# Patient Record
Sex: FEMALE | Race: OTHER | Employment: UNEMPLOYED | URBAN - METROPOLITAN AREA
[De-identification: names, ages, dates, MRNs, and addresses within clinical notes are randomized per-mention and may not be internally consistent; named-entity substitution may affect disease eponyms.]

---

## 2017-01-30 ENCOUNTER — OFFICE VISIT (OUTPATIENT)
Dept: FAMILY MEDICINE CLINIC | Age: 62
End: 2017-01-30

## 2017-01-30 VITALS
TEMPERATURE: 98.4 F | BODY MASS INDEX: 23.47 KG/M2 | SYSTOLIC BLOOD PRESSURE: 100 MMHG | OXYGEN SATURATION: 97 % | WEIGHT: 124.2 LBS | HEART RATE: 119 BPM | DIASTOLIC BLOOD PRESSURE: 68 MMHG

## 2017-01-30 DIAGNOSIS — F41.9 ANXIETY: ICD-10-CM

## 2017-01-30 DIAGNOSIS — E78.2 MIXED HYPERLIPIDEMIA: Primary | ICD-10-CM

## 2017-01-30 DIAGNOSIS — D53.9 MACROCYTIC ANEMIA: ICD-10-CM

## 2017-01-30 DIAGNOSIS — G47.09 OTHER INSOMNIA: ICD-10-CM

## 2017-01-30 DIAGNOSIS — K21.9 GASTROESOPHAGEAL REFLUX DISEASE, ESOPHAGITIS PRESENCE NOT SPECIFIED: ICD-10-CM

## 2017-01-30 DIAGNOSIS — I10 ESSENTIAL HYPERTENSION: ICD-10-CM

## 2017-01-30 DIAGNOSIS — K52.9 AGE (ACUTE GASTROENTERITIS): ICD-10-CM

## 2017-01-30 DIAGNOSIS — M05.731 RHEUMATOID ARTHRITIS INVOLVING RIGHT WRIST WITH POSITIVE RHEUMATOID FACTOR (HCC): ICD-10-CM

## 2017-01-30 DIAGNOSIS — G47.00 INSOMNIA, UNSPECIFIED TYPE: ICD-10-CM

## 2017-01-30 PROCEDURE — 99214 OFFICE O/P EST MOD 30 MIN: CPT | Performed by: FAMILY MEDICINE

## 2017-01-30 RX ORDER — LOSARTAN POTASSIUM 50 MG/1
50 TABLET ORAL DAILY
Qty: 30 TABLET | Refills: 2 | Status: SHIPPED | OUTPATIENT
Start: 2017-01-30 | End: 2017-04-28 | Stop reason: SDUPTHER

## 2017-01-30 RX ORDER — ROSUVASTATIN CALCIUM 20 MG/1
20 TABLET, COATED ORAL NIGHTLY
Qty: 30 TABLET | Refills: 2 | Status: SHIPPED | OUTPATIENT
Start: 2017-01-30 | End: 2017-04-28 | Stop reason: SDUPTHER

## 2017-01-30 RX ORDER — ICOSAPENT ETHYL 1000 MG/1
2 CAPSULE ORAL 2 TIMES DAILY
Qty: 120 CAPSULE | Refills: 2 | Status: SHIPPED | OUTPATIENT
Start: 2017-01-30 | End: 2017-04-28 | Stop reason: SDUPTHER

## 2017-01-30 RX ORDER — CITALOPRAM 20 MG/1
20 TABLET ORAL DAILY
Qty: 30 TABLET | Refills: 2 | Status: SHIPPED | OUTPATIENT
Start: 2017-01-30 | End: 2017-04-28 | Stop reason: SDUPTHER

## 2017-01-30 RX ORDER — TRAZODONE HYDROCHLORIDE 50 MG/1
TABLET ORAL
Qty: 60 TABLET | Refills: 2 | Status: SHIPPED | OUTPATIENT
Start: 2017-01-30 | End: 2017-04-28 | Stop reason: SDUPTHER

## 2017-01-30 RX ORDER — ONDANSETRON 4 MG/1
4 TABLET, FILM COATED ORAL EVERY 12 HOURS PRN
Qty: 12 TABLET | Refills: 0 | Status: SHIPPED | OUTPATIENT
Start: 2017-01-30 | End: 2017-04-28 | Stop reason: SDUPTHER

## 2017-01-30 RX ORDER — GEMFIBROZIL 600 MG/1
600 TABLET, FILM COATED ORAL
Qty: 60 TABLET | Refills: 2 | Status: SHIPPED | OUTPATIENT
Start: 2017-01-30 | End: 2017-04-28 | Stop reason: SDUPTHER

## 2017-01-30 ASSESSMENT — ENCOUNTER SYMPTOMS
BLOOD IN STOOL: 0
NAUSEA: 1
DIARRHEA: 0
VOMITING: 1
SHORTNESS OF BREATH: 0
COUGH: 0
ABDOMINAL PAIN: 0
CONSTIPATION: 0
CHEST TIGHTNESS: 0

## 2017-04-12 ENCOUNTER — OFFICE VISIT (OUTPATIENT)
Dept: FAMILY MEDICINE CLINIC | Age: 62
End: 2017-04-12

## 2017-04-12 DIAGNOSIS — J40 BRONCHITIS: Primary | ICD-10-CM

## 2017-04-12 DIAGNOSIS — J06.9 VIRAL UPPER RESPIRATORY TRACT INFECTION: ICD-10-CM

## 2017-04-12 DIAGNOSIS — D50.9 IRON DEFICIENCY ANEMIA, UNSPECIFIED IRON DEFICIENCY ANEMIA TYPE: ICD-10-CM

## 2017-04-12 DIAGNOSIS — R06.02 SOB (SHORTNESS OF BREATH): ICD-10-CM

## 2017-04-12 PROCEDURE — 93000 ELECTROCARDIOGRAM COMPLETE: CPT | Performed by: FAMILY MEDICINE

## 2017-04-12 PROCEDURE — 99213 OFFICE O/P EST LOW 20 MIN: CPT | Performed by: FAMILY MEDICINE

## 2017-04-12 RX ORDER — PANTOPRAZOLE SODIUM 40 MG/1
TABLET, DELAYED RELEASE ORAL
COMMUNITY
Start: 2017-04-10 | End: 2018-02-13 | Stop reason: SDUPTHER

## 2017-04-12 RX ORDER — AZITHROMYCIN 250 MG/1
TABLET, FILM COATED ORAL
Qty: 1 PACKET | Refills: 0 | Status: SHIPPED | OUTPATIENT
Start: 2017-04-12 | End: 2017-04-22

## 2017-04-12 RX ORDER — ALBUTEROL SULFATE 90 UG/1
2 AEROSOL, METERED RESPIRATORY (INHALATION) EVERY 6 HOURS PRN
Qty: 1 INHALER | Refills: 0 | Status: SHIPPED | OUTPATIENT
Start: 2017-04-12 | End: 2017-05-06 | Stop reason: SDUPTHER

## 2017-04-13 LAB
BASOPHILS ABSOLUTE: 0 K/UL (ref 0–0.2)
BASOPHILS RELATIVE PERCENT: 0.8 %
EOSINOPHILS ABSOLUTE: 0.2 K/UL (ref 0–0.6)
EOSINOPHILS RELATIVE PERCENT: 4.1 %
HCT VFR BLD CALC: 32.7 % (ref 36–48)
HEMOGLOBIN: 10.7 G/DL (ref 12–16)
IRON: 92 UG/DL (ref 37–145)
LYMPHOCYTES ABSOLUTE: 1.4 K/UL (ref 1–5.1)
LYMPHOCYTES RELATIVE PERCENT: 35.9 %
MCH RBC QN AUTO: 35 PG (ref 26–34)
MCHC RBC AUTO-ENTMCNC: 32.7 G/DL (ref 31–36)
MCV RBC AUTO: 106.8 FL (ref 80–100)
MONOCYTES ABSOLUTE: 0.4 K/UL (ref 0–1.3)
MONOCYTES RELATIVE PERCENT: 9.9 %
NEUTROPHILS ABSOLUTE: 1.9 K/UL (ref 1.7–7.7)
NEUTROPHILS RELATIVE PERCENT: 49.3 %
PDW BLD-RTO: 14.2 % (ref 12.4–15.4)
PLATELET # BLD: 241 K/UL (ref 135–450)
PMV BLD AUTO: 9.5 FL (ref 5–10.5)
RBC # BLD: 3.06 M/UL (ref 4–5.2)
WBC # BLD: 3.8 K/UL (ref 4–11)

## 2017-04-14 ENCOUNTER — HOSPITAL ENCOUNTER (OUTPATIENT)
Dept: OTHER | Age: 62
Discharge: HOME | End: 2017-04-14
Attending: FAMILY MEDICINE | Admitting: FAMILY MEDICINE

## 2017-04-14 VITALS
TEMPERATURE: 98.2 F | OXYGEN SATURATION: 97 % | WEIGHT: 128.8 LBS | HEART RATE: 94 BPM | DIASTOLIC BLOOD PRESSURE: 70 MMHG | BODY MASS INDEX: 24.34 KG/M2 | SYSTOLIC BLOOD PRESSURE: 122 MMHG

## 2017-04-14 DIAGNOSIS — R06.02 SOB (SHORTNESS OF BREATH): ICD-10-CM

## 2017-04-14 DIAGNOSIS — J40 BRONCHITIS: ICD-10-CM

## 2017-04-14 ASSESSMENT — ENCOUNTER SYMPTOMS
ABDOMINAL PAIN: 0
SHORTNESS OF BREATH: 1
COUGH: 1
CHEST TIGHTNESS: 0
RHINORRHEA: 1
ABDOMINAL DISTENTION: 0
WHEEZING: 1

## 2017-04-28 ENCOUNTER — OFFICE VISIT (OUTPATIENT)
Dept: FAMILY MEDICINE CLINIC | Age: 62
End: 2017-04-28

## 2017-04-28 VITALS
SYSTOLIC BLOOD PRESSURE: 120 MMHG | HEART RATE: 99 BPM | OXYGEN SATURATION: 97 % | WEIGHT: 125.2 LBS | BODY MASS INDEX: 23.66 KG/M2 | TEMPERATURE: 98.4 F | DIASTOLIC BLOOD PRESSURE: 78 MMHG

## 2017-04-28 DIAGNOSIS — E78.2 MIXED HYPERLIPIDEMIA: Primary | ICD-10-CM

## 2017-04-28 DIAGNOSIS — M05.9 RHEUMATOID ARTHRITIS WITH POSITIVE RHEUMATOID FACTOR, INVOLVING UNSPECIFIED SITE (HCC): ICD-10-CM

## 2017-04-28 DIAGNOSIS — F41.9 ANXIETY: ICD-10-CM

## 2017-04-28 DIAGNOSIS — D50.8 OTHER IRON DEFICIENCY ANEMIA: ICD-10-CM

## 2017-04-28 DIAGNOSIS — I10 ESSENTIAL HYPERTENSION: ICD-10-CM

## 2017-04-28 DIAGNOSIS — J30.89 PERENNIAL ALLERGIC RHINITIS, UNSPECIFIED ALLERGIC RHINITIS TRIGGER: ICD-10-CM

## 2017-04-28 DIAGNOSIS — G47.09 OTHER INSOMNIA: ICD-10-CM

## 2017-04-28 PROCEDURE — 99214 OFFICE O/P EST MOD 30 MIN: CPT | Performed by: FAMILY MEDICINE

## 2017-04-28 RX ORDER — CITALOPRAM 20 MG/1
20 TABLET ORAL DAILY
Qty: 30 TABLET | Refills: 2 | Status: SHIPPED | OUTPATIENT
Start: 2017-04-28 | End: 2017-07-31 | Stop reason: SDUPTHER

## 2017-04-28 RX ORDER — ROSUVASTATIN CALCIUM 20 MG/1
20 TABLET, COATED ORAL NIGHTLY
Qty: 30 TABLET | Refills: 2 | Status: SHIPPED | OUTPATIENT
Start: 2017-04-28 | End: 2017-07-31 | Stop reason: SDUPTHER

## 2017-04-28 RX ORDER — ICOSAPENT ETHYL 1000 MG/1
2 CAPSULE ORAL 2 TIMES DAILY
Qty: 120 CAPSULE | Refills: 2 | Status: SHIPPED | OUTPATIENT
Start: 2017-04-28 | End: 2017-07-31 | Stop reason: SDUPTHER

## 2017-04-28 RX ORDER — GEMFIBROZIL 600 MG/1
600 TABLET, FILM COATED ORAL
Qty: 60 TABLET | Refills: 2 | Status: SHIPPED | OUTPATIENT
Start: 2017-04-28 | End: 2017-07-31 | Stop reason: SDUPTHER

## 2017-04-28 RX ORDER — TRAZODONE HYDROCHLORIDE 50 MG/1
TABLET ORAL
Qty: 60 TABLET | Refills: 2 | Status: SHIPPED | OUTPATIENT
Start: 2017-04-28 | End: 2017-07-31 | Stop reason: SDUPTHER

## 2017-04-28 RX ORDER — LOSARTAN POTASSIUM 50 MG/1
50 TABLET ORAL DAILY
Qty: 30 TABLET | Refills: 2 | Status: SHIPPED | OUTPATIENT
Start: 2017-04-28 | End: 2017-07-31 | Stop reason: SDUPTHER

## 2017-04-28 ASSESSMENT — ENCOUNTER SYMPTOMS
BLOOD IN STOOL: 0
CHEST TIGHTNESS: 0
SINUS PRESSURE: 0
RHINORRHEA: 0
COUGH: 0
ABDOMINAL PAIN: 0
SHORTNESS OF BREATH: 0

## 2017-07-28 ENCOUNTER — TELEPHONE (OUTPATIENT)
Dept: FAMILY MEDICINE CLINIC | Age: 62
End: 2017-07-28

## 2017-07-31 ENCOUNTER — OFFICE VISIT (OUTPATIENT)
Dept: FAMILY MEDICINE CLINIC | Age: 62
End: 2017-07-31

## 2017-07-31 VITALS
HEART RATE: 115 BPM | BODY MASS INDEX: 24.79 KG/M2 | WEIGHT: 131.2 LBS | DIASTOLIC BLOOD PRESSURE: 76 MMHG | OXYGEN SATURATION: 98 % | TEMPERATURE: 99.2 F | SYSTOLIC BLOOD PRESSURE: 118 MMHG

## 2017-07-31 DIAGNOSIS — M05.731 RHEUMATOID ARTHRITIS INVOLVING RIGHT WRIST WITH POSITIVE RHEUMATOID FACTOR (HCC): ICD-10-CM

## 2017-07-31 DIAGNOSIS — G47.00 INSOMNIA, UNSPECIFIED TYPE: ICD-10-CM

## 2017-07-31 DIAGNOSIS — E78.2 MIXED HYPERLIPIDEMIA: Primary | ICD-10-CM

## 2017-07-31 DIAGNOSIS — J30.89 NON-SEASONAL ALLERGIC RHINITIS, UNSPECIFIED ALLERGIC RHINITIS TRIGGER: ICD-10-CM

## 2017-07-31 DIAGNOSIS — F41.9 ANXIETY: ICD-10-CM

## 2017-07-31 DIAGNOSIS — D50.9 IRON DEFICIENCY ANEMIA, UNSPECIFIED IRON DEFICIENCY ANEMIA TYPE: ICD-10-CM

## 2017-07-31 PROCEDURE — 99214 OFFICE O/P EST MOD 30 MIN: CPT | Performed by: FAMILY MEDICINE

## 2017-07-31 RX ORDER — ROSUVASTATIN CALCIUM 20 MG/1
20 TABLET, COATED ORAL NIGHTLY
Qty: 30 TABLET | Refills: 2 | Status: SHIPPED | OUTPATIENT
Start: 2017-07-31 | End: 2017-10-30 | Stop reason: SDUPTHER

## 2017-07-31 RX ORDER — GEMFIBROZIL 600 MG/1
600 TABLET, FILM COATED ORAL
Qty: 60 TABLET | Refills: 2 | Status: SHIPPED | OUTPATIENT
Start: 2017-07-31 | End: 2017-10-30 | Stop reason: SDUPTHER

## 2017-07-31 RX ORDER — ICOSAPENT ETHYL 1000 MG/1
2 CAPSULE ORAL 2 TIMES DAILY
Qty: 120 CAPSULE | Refills: 2 | Status: SHIPPED | OUTPATIENT
Start: 2017-07-31 | End: 2017-10-30 | Stop reason: SDUPTHER

## 2017-07-31 RX ORDER — LOSARTAN POTASSIUM 50 MG/1
50 TABLET ORAL DAILY
Qty: 30 TABLET | Refills: 2 | Status: SHIPPED | OUTPATIENT
Start: 2017-07-31 | End: 2017-10-30 | Stop reason: SDUPTHER

## 2017-07-31 RX ORDER — TRAZODONE HYDROCHLORIDE 100 MG/1
TABLET ORAL
Qty: 30 TABLET | Refills: 2 | Status: SHIPPED | OUTPATIENT
Start: 2017-07-31 | End: 2017-10-30 | Stop reason: SDUPTHER

## 2017-07-31 RX ORDER — CITALOPRAM 20 MG/1
20 TABLET ORAL DAILY
Qty: 30 TABLET | Refills: 2 | Status: SHIPPED | OUTPATIENT
Start: 2017-07-31 | End: 2017-10-30 | Stop reason: SDUPTHER

## 2017-07-31 ASSESSMENT — PATIENT HEALTH QUESTIONNAIRE - PHQ9
SUM OF ALL RESPONSES TO PHQ9 QUESTIONS 1 & 2: 0
1. LITTLE INTEREST OR PLEASURE IN DOING THINGS: 0
SUM OF ALL RESPONSES TO PHQ QUESTIONS 1-9: 0
2. FEELING DOWN, DEPRESSED OR HOPELESS: 0

## 2017-07-31 ASSESSMENT — ENCOUNTER SYMPTOMS
CHEST TIGHTNESS: 0
SHORTNESS OF BREATH: 0
ABDOMINAL PAIN: 0
COUGH: 0
RHINORRHEA: 0
BLOOD IN STOOL: 0

## 2017-10-30 ENCOUNTER — OFFICE VISIT (OUTPATIENT)
Dept: FAMILY MEDICINE CLINIC | Age: 62
End: 2017-10-30

## 2017-10-30 VITALS
WEIGHT: 131 LBS | HEART RATE: 86 BPM | OXYGEN SATURATION: 98 % | SYSTOLIC BLOOD PRESSURE: 124 MMHG | BODY MASS INDEX: 24.75 KG/M2 | DIASTOLIC BLOOD PRESSURE: 86 MMHG | TEMPERATURE: 98.6 F

## 2017-10-30 DIAGNOSIS — M05.731 RHEUMATOID ARTHRITIS INVOLVING RIGHT WRIST WITH POSITIVE RHEUMATOID FACTOR (HCC): ICD-10-CM

## 2017-10-30 DIAGNOSIS — F41.9 ANXIETY: Primary | ICD-10-CM

## 2017-10-30 DIAGNOSIS — G47.00 INSOMNIA, UNSPECIFIED TYPE: ICD-10-CM

## 2017-10-30 DIAGNOSIS — I10 ESSENTIAL HYPERTENSION: ICD-10-CM

## 2017-10-30 DIAGNOSIS — J30.89 NON-SEASONAL ALLERGIC RHINITIS, UNSPECIFIED CHRONICITY, UNSPECIFIED TRIGGER: ICD-10-CM

## 2017-10-30 DIAGNOSIS — E78.2 MIXED HYPERLIPIDEMIA: ICD-10-CM

## 2017-10-30 PROCEDURE — G8484 FLU IMMUNIZE NO ADMIN: HCPCS | Performed by: FAMILY MEDICINE

## 2017-10-30 PROCEDURE — 3017F COLORECTAL CA SCREEN DOC REV: CPT | Performed by: FAMILY MEDICINE

## 2017-10-30 PROCEDURE — 1036F TOBACCO NON-USER: CPT | Performed by: FAMILY MEDICINE

## 2017-10-30 PROCEDURE — G8427 DOCREV CUR MEDS BY ELIG CLIN: HCPCS | Performed by: FAMILY MEDICINE

## 2017-10-30 PROCEDURE — 3014F SCREEN MAMMO DOC REV: CPT | Performed by: FAMILY MEDICINE

## 2017-10-30 PROCEDURE — 99214 OFFICE O/P EST MOD 30 MIN: CPT | Performed by: FAMILY MEDICINE

## 2017-10-30 PROCEDURE — G8420 CALC BMI NORM PARAMETERS: HCPCS | Performed by: FAMILY MEDICINE

## 2017-10-30 RX ORDER — ICOSAPENT ETHYL 1000 MG/1
2 CAPSULE ORAL 2 TIMES DAILY
Qty: 120 CAPSULE | Refills: 2 | Status: SHIPPED | OUTPATIENT
Start: 2017-10-30 | End: 2018-02-13 | Stop reason: SDUPTHER

## 2017-10-30 RX ORDER — ROSUVASTATIN CALCIUM 20 MG/1
20 TABLET, COATED ORAL NIGHTLY
Qty: 30 TABLET | Refills: 2 | Status: SHIPPED | OUTPATIENT
Start: 2017-10-30 | End: 2018-02-13 | Stop reason: SDUPTHER

## 2017-10-30 RX ORDER — TRAZODONE HYDROCHLORIDE 100 MG/1
TABLET ORAL
Qty: 30 TABLET | Refills: 2 | Status: SHIPPED | OUTPATIENT
Start: 2017-10-30 | End: 2018-02-13 | Stop reason: SDUPTHER

## 2017-10-30 RX ORDER — CITALOPRAM 40 MG/1
40 TABLET ORAL DAILY
Qty: 30 TABLET | Refills: 2 | Status: SHIPPED | OUTPATIENT
Start: 2017-10-30 | End: 2018-02-13 | Stop reason: SDUPTHER

## 2017-10-30 RX ORDER — LOSARTAN POTASSIUM 50 MG/1
50 TABLET ORAL DAILY
Qty: 30 TABLET | Refills: 2 | Status: SHIPPED | OUTPATIENT
Start: 2017-10-30 | End: 2018-02-13 | Stop reason: SDUPTHER

## 2017-10-30 RX ORDER — GEMFIBROZIL 600 MG/1
600 TABLET, FILM COATED ORAL
Qty: 60 TABLET | Refills: 2 | Status: SHIPPED | OUTPATIENT
Start: 2017-10-30 | End: 2018-02-13 | Stop reason: SDUPTHER

## 2017-10-30 ASSESSMENT — PATIENT HEALTH QUESTIONNAIRE - PHQ9
SUM OF ALL RESPONSES TO PHQ9 QUESTIONS 1 & 2: 1
SUM OF ALL RESPONSES TO PHQ QUESTIONS 1-9: 1
2. FEELING DOWN, DEPRESSED OR HOPELESS: 0
1. LITTLE INTEREST OR PLEASURE IN DOING THINGS: 1

## 2017-11-05 ASSESSMENT — ENCOUNTER SYMPTOMS
SHORTNESS OF BREATH: 0
ABDOMINAL PAIN: 0
COUGH: 0
CHEST TIGHTNESS: 0
BLOOD IN STOOL: 0

## 2017-11-05 NOTE — PROGRESS NOTES
well-developed and well-nourished. HENT:   Right Ear: External ear normal.   Left Ear: External ear normal.   Nose: Nose normal.   Mouth/Throat: Oropharynx is clear and moist.   Eyes: EOM are normal. Pupils are equal, round, and reactive to light. Neck: Normal range of motion. Neck supple. No JVD present. No thyromegaly present. Cardiovascular: Normal rate and regular rhythm. Pulmonary/Chest: Effort normal and breath sounds normal.   Abdominal: Soft. Bowel sounds are normal. There is no tenderness. Neurological: She is alert and oriented to person, place, and time. No cranial nerve deficit. Skin: No rash noted. Psychiatric: She has a normal mood and affect. Her behavior is normal. Thought content normal.   Nursing note and vitals reviewed. ASSESSMENT/PLAN:    1. Anxiety  Refill Celexa    2. Mixed hyperlipidemia  Continue current mgt  Refill meds  Labs  appr diet mgt    3. Non-seasonal allergic rhinitis, unspecified chronicity, unspecified trigger  Prn Flonase    4. Essential hypertension  Refill Cozaar  Labs  Intermittent ambulatory BP checks    5. Insomnia, unspecified type  appr sleep mgt  Refill Trazodone    6. 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 (HCC)  No sx at this time      No orders of the defined types were placed in this encounter.     Current Outpatient Prescriptions   Medication Sig Dispense Refill    rosuvastatin (CRESTOR) 20 MG tablet Take 1 tablet by mouth nightly 30 tablet 2    losartan (COZAAR) 50 MG tablet Take 1 tablet by mouth daily 30 tablet 2    gemfibrozil (LOPID) 600 MG tablet Take 1 tablet by mouth 2 times daily (before meals) 60 tablet 2    Icosapent Ethyl (VASCEPA) 1 g CAPS capsule Take 2 capsules by mouth 2 times daily 120 capsule 2    citalopram (CELEXA) 40 MG tablet Take 1 tablet by mouth daily 30 tablet 2    traZODone (DESYREL) 100 MG tablet 1 po qhs 30 tablet 2    pantoprazole (PROTONIX) 40 MG tablet       Biotin 10 MG tablet Take 10 mg by mouth daily      Cholecalciferol (VITAMIN D) 2000 UNITS CAPS capsule Take by mouth      fluticasone (FLONASE) 50 MCG/ACT nasal spray 1 spray by Nasal route daily 1 Bottle 2    vitamin B-12 (CYANOCOBALAMIN) 100 MCG tablet Take 50 mcg by mouth daily      Pyridoxine HCl (VITAMIN B-6) 50 MG tablet Take 50 mg by mouth daily      methylfolate (DEPLIN) 7.5 MG TABS tablet Take 7.5 tablets by mouth daily 30 tablet 1    PROAIR  (90 BASE) MCG/ACT inhaler INHALE 2 PUFFS INTO THE LUNGS EVERY 6 HOURS AS NEEDED FOR WHEEZING 8.5 g 0     No current facility-administered medications for this visit. No Follow-up on file.     Navdeep Maki MD

## 2018-02-13 ENCOUNTER — OFFICE VISIT (OUTPATIENT)
Dept: FAMILY MEDICINE CLINIC | Age: 63
End: 2018-02-13

## 2018-02-13 VITALS
HEART RATE: 92 BPM | SYSTOLIC BLOOD PRESSURE: 136 MMHG | TEMPERATURE: 98.5 F | DIASTOLIC BLOOD PRESSURE: 86 MMHG | BODY MASS INDEX: 25.62 KG/M2 | OXYGEN SATURATION: 98 % | WEIGHT: 135.6 LBS

## 2018-02-13 DIAGNOSIS — M05.731 RHEUMATOID ARTHRITIS INVOLVING RIGHT WRIST WITH POSITIVE RHEUMATOID FACTOR (HCC): ICD-10-CM

## 2018-02-13 DIAGNOSIS — J30.89 NON-SEASONAL ALLERGIC RHINITIS, UNSPECIFIED CHRONICITY, UNSPECIFIED TRIGGER: ICD-10-CM

## 2018-02-13 DIAGNOSIS — I10 ESSENTIAL HYPERTENSION: ICD-10-CM

## 2018-02-13 DIAGNOSIS — D50.9 IRON DEFICIENCY ANEMIA, UNSPECIFIED IRON DEFICIENCY ANEMIA TYPE: ICD-10-CM

## 2018-02-13 DIAGNOSIS — F41.9 ANXIETY: ICD-10-CM

## 2018-02-13 DIAGNOSIS — E78.2 MIXED HYPERLIPIDEMIA: Primary | ICD-10-CM

## 2018-02-13 DIAGNOSIS — Z12.39 BREAST CANCER SCREENING: ICD-10-CM

## 2018-02-13 DIAGNOSIS — G47.00 INSOMNIA, UNSPECIFIED TYPE: ICD-10-CM

## 2018-02-13 DIAGNOSIS — J06.9 VIRAL URI: ICD-10-CM

## 2018-02-13 PROCEDURE — 99214 OFFICE O/P EST MOD 30 MIN: CPT | Performed by: FAMILY MEDICINE

## 2018-02-13 PROCEDURE — 3014F SCREEN MAMMO DOC REV: CPT | Performed by: FAMILY MEDICINE

## 2018-02-13 PROCEDURE — 3017F COLORECTAL CA SCREEN DOC REV: CPT | Performed by: FAMILY MEDICINE

## 2018-02-13 PROCEDURE — 1036F TOBACCO NON-USER: CPT | Performed by: FAMILY MEDICINE

## 2018-02-13 PROCEDURE — G8427 DOCREV CUR MEDS BY ELIG CLIN: HCPCS | Performed by: FAMILY MEDICINE

## 2018-02-13 PROCEDURE — G8484 FLU IMMUNIZE NO ADMIN: HCPCS | Performed by: FAMILY MEDICINE

## 2018-02-13 PROCEDURE — G8419 CALC BMI OUT NRM PARAM NOF/U: HCPCS | Performed by: FAMILY MEDICINE

## 2018-02-13 RX ORDER — ROSUVASTATIN CALCIUM 20 MG/1
20 TABLET, COATED ORAL NIGHTLY
Qty: 30 TABLET | Refills: 2 | Status: SHIPPED | OUTPATIENT
Start: 2018-02-13 | End: 2018-06-08 | Stop reason: SDUPTHER

## 2018-02-13 RX ORDER — PANTOPRAZOLE SODIUM 20 MG/1
20 TABLET, DELAYED RELEASE ORAL DAILY
Qty: 30 TABLET | Refills: 0 | Status: SHIPPED | OUTPATIENT
Start: 2018-02-13 | End: 2018-03-19 | Stop reason: SDUPTHER

## 2018-02-13 RX ORDER — LOSARTAN POTASSIUM 50 MG/1
50 TABLET ORAL DAILY
Qty: 30 TABLET | Refills: 2 | Status: SHIPPED | OUTPATIENT
Start: 2018-02-13 | End: 2018-06-08 | Stop reason: SDUPTHER

## 2018-02-13 RX ORDER — CITALOPRAM 40 MG/1
40 TABLET ORAL DAILY
Qty: 30 TABLET | Refills: 2 | Status: SHIPPED | OUTPATIENT
Start: 2018-02-13 | End: 2018-06-08 | Stop reason: SDUPTHER

## 2018-02-13 RX ORDER — ICOSAPENT ETHYL 1000 MG/1
2 CAPSULE ORAL 2 TIMES DAILY
Qty: 120 CAPSULE | Refills: 2 | Status: SHIPPED | OUTPATIENT
Start: 2018-02-13 | End: 2018-06-08 | Stop reason: SDUPTHER

## 2018-02-13 RX ORDER — GEMFIBROZIL 600 MG/1
600 TABLET, FILM COATED ORAL
Qty: 60 TABLET | Refills: 2 | Status: SHIPPED | OUTPATIENT
Start: 2018-02-13 | End: 2018-06-08 | Stop reason: SDUPTHER

## 2018-02-13 RX ORDER — TRAZODONE HYDROCHLORIDE 100 MG/1
TABLET ORAL
Qty: 30 TABLET | Refills: 2 | Status: SHIPPED | OUTPATIENT
Start: 2018-02-13 | End: 2018-06-08 | Stop reason: SDUPTHER

## 2018-02-13 ASSESSMENT — PATIENT HEALTH QUESTIONNAIRE - PHQ9
1. LITTLE INTEREST OR PLEASURE IN DOING THINGS: 0
SUM OF ALL RESPONSES TO PHQ9 QUESTIONS 1 & 2: 0
2. FEELING DOWN, DEPRESSED OR HOPELESS: 0
SUM OF ALL RESPONSES TO PHQ QUESTIONS 1-9: 0

## 2018-03-13 ASSESSMENT — ENCOUNTER SYMPTOMS
BLOOD IN STOOL: 0
COUGH: 0
ABDOMINAL PAIN: 0
RHINORRHEA: 1
CHEST TIGHTNESS: 0
SHORTNESS OF BREATH: 0

## 2018-03-13 NOTE — PROGRESS NOTES
gemfibrozil (LOPID) 600 MG tablet Take 1 tablet by mouth 2 times daily (before meals) 60 tablet 2    Icosapent Ethyl (VASCEPA) 1 g CAPS capsule Take 2 capsules by mouth 2 times daily 120 capsule 2    citalopram (CELEXA) 40 MG tablet Take 1 tablet by mouth daily 30 tablet 2    traZODone (DESYREL) 100 MG tablet 1 po qhs 30 tablet 2    pantoprazole (PROTONIX) 20 MG tablet Take 1 tablet by mouth daily 30 tablet 0    Biotin 10 MG tablet Take 10 mg by mouth daily      Cholecalciferol (VITAMIN D) 2000 UNITS CAPS capsule Take by mouth      fluticasone (FLONASE) 50 MCG/ACT nasal spray 1 spray by Nasal route daily 1 Bottle 2    vitamin B-12 (CYANOCOBALAMIN) 100 MCG tablet Take 50 mcg by mouth daily      Pyridoxine HCl (VITAMIN B-6) 50 MG tablet Take 50 mg by mouth daily      methylfolate (DEPLIN) 7.5 MG TABS tablet Take 7.5 tablets by mouth daily 30 tablet 1    PROAIR  (90 BASE) MCG/ACT inhaler INHALE 2 PUFFS INTO THE LUNGS EVERY 6 HOURS AS NEEDED FOR WHEEZING 8.5 g 0     No current facility-administered medications for this visit. No Follow-up on file.     Katey Mercedes MD

## 2018-03-20 RX ORDER — PANTOPRAZOLE SODIUM 20 MG/1
20 TABLET, DELAYED RELEASE ORAL DAILY
Qty: 30 TABLET | Refills: 2 | Status: SHIPPED | OUTPATIENT
Start: 2018-03-20 | End: 2022-07-26 | Stop reason: ALTCHOICE

## 2018-06-08 ENCOUNTER — OFFICE VISIT (OUTPATIENT)
Dept: FAMILY MEDICINE CLINIC | Age: 63
End: 2018-06-08

## 2018-06-08 VITALS
TEMPERATURE: 98.4 F | DIASTOLIC BLOOD PRESSURE: 68 MMHG | HEART RATE: 73 BPM | BODY MASS INDEX: 24.53 KG/M2 | OXYGEN SATURATION: 96 % | WEIGHT: 129.8 LBS | SYSTOLIC BLOOD PRESSURE: 110 MMHG

## 2018-06-08 DIAGNOSIS — F41.9 ANXIETY: ICD-10-CM

## 2018-06-08 DIAGNOSIS — E78.2 MIXED HYPERLIPIDEMIA: Primary | ICD-10-CM

## 2018-06-08 DIAGNOSIS — G47.00 INSOMNIA, UNSPECIFIED TYPE: ICD-10-CM

## 2018-06-08 DIAGNOSIS — F32.A DEPRESSION, UNSPECIFIED DEPRESSION TYPE: ICD-10-CM

## 2018-06-08 DIAGNOSIS — J30.89 NON-SEASONAL ALLERGIC RHINITIS, UNSPECIFIED CHRONICITY, UNSPECIFIED TRIGGER: ICD-10-CM

## 2018-06-08 DIAGNOSIS — M05.731 RHEUMATOID ARTHRITIS INVOLVING RIGHT WRIST WITH POSITIVE RHEUMATOID FACTOR (HCC): ICD-10-CM

## 2018-06-08 PROCEDURE — G8420 CALC BMI NORM PARAMETERS: HCPCS | Performed by: FAMILY MEDICINE

## 2018-06-08 PROCEDURE — 1036F TOBACCO NON-USER: CPT | Performed by: FAMILY MEDICINE

## 2018-06-08 PROCEDURE — 99214 OFFICE O/P EST MOD 30 MIN: CPT | Performed by: FAMILY MEDICINE

## 2018-06-08 PROCEDURE — 3017F COLORECTAL CA SCREEN DOC REV: CPT | Performed by: FAMILY MEDICINE

## 2018-06-08 PROCEDURE — G8427 DOCREV CUR MEDS BY ELIG CLIN: HCPCS | Performed by: FAMILY MEDICINE

## 2018-06-08 RX ORDER — ICOSAPENT ETHYL 1000 MG/1
2 CAPSULE ORAL 2 TIMES DAILY
Qty: 120 CAPSULE | Refills: 2 | Status: SHIPPED | OUTPATIENT
Start: 2018-06-08 | End: 2018-09-17 | Stop reason: SDUPTHER

## 2018-06-08 RX ORDER — LOSARTAN POTASSIUM 50 MG/1
50 TABLET ORAL DAILY
Qty: 30 TABLET | Refills: 2 | Status: SHIPPED | OUTPATIENT
Start: 2018-06-08 | End: 2018-09-17 | Stop reason: SDUPTHER

## 2018-06-08 RX ORDER — TRAZODONE HYDROCHLORIDE 100 MG/1
TABLET ORAL
Qty: 30 TABLET | Refills: 2 | Status: SHIPPED | OUTPATIENT
Start: 2018-06-08 | End: 2018-09-17 | Stop reason: SDUPTHER

## 2018-06-08 RX ORDER — ROSUVASTATIN CALCIUM 20 MG/1
20 TABLET, COATED ORAL NIGHTLY
Qty: 30 TABLET | Refills: 2 | Status: SHIPPED | OUTPATIENT
Start: 2018-06-08 | End: 2018-09-17 | Stop reason: SDUPTHER

## 2018-06-08 RX ORDER — GEMFIBROZIL 600 MG/1
600 TABLET, FILM COATED ORAL
Qty: 60 TABLET | Refills: 2 | Status: SHIPPED | OUTPATIENT
Start: 2018-06-08 | End: 2018-09-17 | Stop reason: SDUPTHER

## 2018-06-08 RX ORDER — PANTOPRAZOLE SODIUM 20 MG/1
20 TABLET, DELAYED RELEASE ORAL DAILY
Qty: 30 TABLET | Refills: 2 | Status: CANCELLED | OUTPATIENT
Start: 2018-06-08

## 2018-06-08 RX ORDER — CITALOPRAM 40 MG/1
40 TABLET ORAL DAILY
Qty: 30 TABLET | Refills: 2 | Status: SHIPPED | OUTPATIENT
Start: 2018-06-08 | End: 2018-09-17 | Stop reason: SDUPTHER

## 2018-06-10 ASSESSMENT — ENCOUNTER SYMPTOMS
BLOOD IN STOOL: 0
CHEST TIGHTNESS: 0
COUGH: 0
ABDOMINAL PAIN: 0
SHORTNESS OF BREATH: 0

## 2018-06-25 ENCOUNTER — TELEPHONE (OUTPATIENT)
Dept: INTERVENTIONAL RADIOLOGY/VASCULAR | Age: 63
End: 2018-06-25

## 2018-06-25 NOTE — TELEPHONE ENCOUNTER
Left message for patient to call [facility] for Genetic Counseling appointment and that patient there are two open appointments for this Thursday.

## 2018-09-14 RX ORDER — CITALOPRAM 40 MG/1
40 TABLET ORAL DAILY
Qty: 30 TABLET | Refills: 2 | OUTPATIENT
Start: 2018-09-14

## 2018-09-14 RX ORDER — TRAZODONE HYDROCHLORIDE 100 MG/1
TABLET ORAL
Qty: 30 TABLET | Refills: 2 | OUTPATIENT
Start: 2018-09-14

## 2018-09-17 ENCOUNTER — OFFICE VISIT (OUTPATIENT)
Dept: FAMILY MEDICINE CLINIC | Age: 63
End: 2018-09-17

## 2018-09-17 VITALS
BODY MASS INDEX: 23.88 KG/M2 | SYSTOLIC BLOOD PRESSURE: 124 MMHG | TEMPERATURE: 98.6 F | OXYGEN SATURATION: 98 % | HEART RATE: 83 BPM | DIASTOLIC BLOOD PRESSURE: 78 MMHG | WEIGHT: 126.4 LBS

## 2018-09-17 DIAGNOSIS — F41.9 ANXIETY: ICD-10-CM

## 2018-09-17 DIAGNOSIS — M05.731 RHEUMATOID ARTHRITIS INVOLVING RIGHT WRIST WITH POSITIVE RHEUMATOID FACTOR (HCC): ICD-10-CM

## 2018-09-17 DIAGNOSIS — S20.212A CONTUSION OF LEFT CHEST WALL, INITIAL ENCOUNTER: ICD-10-CM

## 2018-09-17 DIAGNOSIS — Z23 NEED FOR VACCINATION: ICD-10-CM

## 2018-09-17 DIAGNOSIS — G47.00 INSOMNIA, UNSPECIFIED TYPE: ICD-10-CM

## 2018-09-17 DIAGNOSIS — I10 ESSENTIAL HYPERTENSION: ICD-10-CM

## 2018-09-17 DIAGNOSIS — E78.5 HYPERLIPIDEMIA, UNSPECIFIED HYPERLIPIDEMIA TYPE: Primary | ICD-10-CM

## 2018-09-17 PROCEDURE — 90686 IIV4 VACC NO PRSV 0.5 ML IM: CPT | Performed by: FAMILY MEDICINE

## 2018-09-17 PROCEDURE — 99214 OFFICE O/P EST MOD 30 MIN: CPT | Performed by: FAMILY MEDICINE

## 2018-09-17 PROCEDURE — G8420 CALC BMI NORM PARAMETERS: HCPCS | Performed by: FAMILY MEDICINE

## 2018-09-17 PROCEDURE — 1036F TOBACCO NON-USER: CPT | Performed by: FAMILY MEDICINE

## 2018-09-17 PROCEDURE — 90471 IMMUNIZATION ADMIN: CPT | Performed by: FAMILY MEDICINE

## 2018-09-17 PROCEDURE — G8427 DOCREV CUR MEDS BY ELIG CLIN: HCPCS | Performed by: FAMILY MEDICINE

## 2018-09-17 PROCEDURE — 3017F COLORECTAL CA SCREEN DOC REV: CPT | Performed by: FAMILY MEDICINE

## 2018-09-17 RX ORDER — LOSARTAN POTASSIUM 50 MG/1
50 TABLET ORAL DAILY
Qty: 30 TABLET | Refills: 2 | Status: SHIPPED | OUTPATIENT
Start: 2018-09-17 | End: 2019-01-03 | Stop reason: SDUPTHER

## 2018-09-17 RX ORDER — GEMFIBROZIL 600 MG/1
600 TABLET, FILM COATED ORAL
Qty: 60 TABLET | Refills: 2 | Status: SHIPPED | OUTPATIENT
Start: 2018-09-17 | End: 2019-04-09 | Stop reason: SDUPTHER

## 2018-09-17 RX ORDER — ICOSAPENT ETHYL 1000 MG/1
2 CAPSULE ORAL 2 TIMES DAILY
Qty: 120 CAPSULE | Refills: 2 | Status: SHIPPED | OUTPATIENT
Start: 2018-09-17 | End: 2019-01-03 | Stop reason: SDUPTHER

## 2018-09-17 RX ORDER — CITALOPRAM 40 MG/1
40 TABLET ORAL DAILY
Qty: 30 TABLET | Refills: 2 | Status: SHIPPED | OUTPATIENT
Start: 2018-09-17 | End: 2019-01-08 | Stop reason: SDUPTHER

## 2018-09-17 RX ORDER — ROSUVASTATIN CALCIUM 20 MG/1
20 TABLET, COATED ORAL NIGHTLY
Qty: 30 TABLET | Refills: 2 | Status: SHIPPED | OUTPATIENT
Start: 2018-09-17 | End: 2019-01-03 | Stop reason: SDUPTHER

## 2018-09-17 RX ORDER — TRAZODONE HYDROCHLORIDE 100 MG/1
TABLET ORAL
Qty: 30 TABLET | Refills: 2 | Status: SHIPPED | OUTPATIENT
Start: 2018-09-17 | End: 2019-01-03 | Stop reason: SDUPTHER

## 2018-09-17 NOTE — PROGRESS NOTES
Vaccine Information Sheet, \"Influenza - Inactivated\"  given to Tyler Thorpe, or parent/legal guardian of  Tyler Thorpe and verbalized understanding. Patient responses:    Have you ever had a reaction to a flu vaccine? No  Are you able to eat eggs without adverse effects? Yes  Do you have any current illness? No  Have you ever had [place] Syndrome? No    Flu vaccine given per order. Please see immunization tab.

## 2018-09-18 ASSESSMENT — ENCOUNTER SYMPTOMS
BLOOD IN STOOL: 0
SHORTNESS OF BREATH: 0
ABDOMINAL PAIN: 0
COUGH: 0
DIARRHEA: 0
CONSTIPATION: 0

## 2019-01-03 RX ORDER — ROSUVASTATIN CALCIUM 20 MG/1
20 TABLET, COATED ORAL NIGHTLY
Qty: 14 TABLET | Refills: 0 | Status: SHIPPED | OUTPATIENT
Start: 2019-01-03 | End: 2019-01-14 | Stop reason: SDUPTHER

## 2019-01-03 RX ORDER — TRAZODONE HYDROCHLORIDE 100 MG/1
TABLET ORAL
Qty: 14 TABLET | Refills: 0 | Status: SHIPPED | OUTPATIENT
Start: 2019-01-03 | End: 2019-01-14 | Stop reason: SDUPTHER

## 2019-01-03 RX ORDER — ICOSAPENT ETHYL 1000 MG/1
2 CAPSULE ORAL 2 TIMES DAILY
Qty: 28 CAPSULE | Refills: 0 | Status: SHIPPED | OUTPATIENT
Start: 2019-01-03 | End: 2019-04-09 | Stop reason: SDUPTHER

## 2019-01-03 RX ORDER — LOSARTAN POTASSIUM 50 MG/1
50 TABLET ORAL DAILY
Qty: 14 TABLET | Refills: 0 | Status: SHIPPED | OUTPATIENT
Start: 2019-01-03 | End: 2019-01-14 | Stop reason: SDUPTHER

## 2019-01-09 RX ORDER — CITALOPRAM 40 MG/1
40 TABLET ORAL DAILY
Qty: 7 TABLET | Refills: 0 | Status: SHIPPED | OUTPATIENT
Start: 2019-01-09 | End: 2019-01-14 | Stop reason: SDUPTHER

## 2019-01-14 ENCOUNTER — OFFICE VISIT (OUTPATIENT)
Dept: FAMILY MEDICINE CLINIC | Age: 64
End: 2019-01-14
Payer: COMMERCIAL

## 2019-01-14 VITALS
TEMPERATURE: 98.8 F | WEIGHT: 129.2 LBS | BODY MASS INDEX: 24.41 KG/M2 | HEART RATE: 76 BPM | OXYGEN SATURATION: 98 % | DIASTOLIC BLOOD PRESSURE: 70 MMHG | SYSTOLIC BLOOD PRESSURE: 112 MMHG

## 2019-01-14 DIAGNOSIS — G47.00 INSOMNIA, UNSPECIFIED TYPE: ICD-10-CM

## 2019-01-14 DIAGNOSIS — F41.9 ANXIETY: ICD-10-CM

## 2019-01-14 DIAGNOSIS — M05.731 RHEUMATOID ARTHRITIS INVOLVING RIGHT WRIST WITH POSITIVE RHEUMATOID FACTOR (HCC): ICD-10-CM

## 2019-01-14 DIAGNOSIS — J30.89 NON-SEASONAL ALLERGIC RHINITIS, UNSPECIFIED TRIGGER: ICD-10-CM

## 2019-01-14 DIAGNOSIS — I10 ESSENTIAL HYPERTENSION: ICD-10-CM

## 2019-01-14 DIAGNOSIS — E78.5 HYPERLIPIDEMIA, UNSPECIFIED HYPERLIPIDEMIA TYPE: Primary | ICD-10-CM

## 2019-01-14 PROCEDURE — G8482 FLU IMMUNIZE ORDER/ADMIN: HCPCS | Performed by: FAMILY MEDICINE

## 2019-01-14 PROCEDURE — G8427 DOCREV CUR MEDS BY ELIG CLIN: HCPCS | Performed by: FAMILY MEDICINE

## 2019-01-14 PROCEDURE — 1036F TOBACCO NON-USER: CPT | Performed by: FAMILY MEDICINE

## 2019-01-14 PROCEDURE — 3017F COLORECTAL CA SCREEN DOC REV: CPT | Performed by: FAMILY MEDICINE

## 2019-01-14 PROCEDURE — G8420 CALC BMI NORM PARAMETERS: HCPCS | Performed by: FAMILY MEDICINE

## 2019-01-14 PROCEDURE — 99214 OFFICE O/P EST MOD 30 MIN: CPT | Performed by: FAMILY MEDICINE

## 2019-01-14 RX ORDER — TRAZODONE HYDROCHLORIDE 100 MG/1
TABLET ORAL
Qty: 30 TABLET | Refills: 2 | Status: SHIPPED | OUTPATIENT
Start: 2019-01-14 | End: 2019-04-09 | Stop reason: SDUPTHER

## 2019-01-14 RX ORDER — CITALOPRAM 40 MG/1
40 TABLET ORAL DAILY
Qty: 30 TABLET | Refills: 2 | Status: SHIPPED | OUTPATIENT
Start: 2019-01-14 | End: 2019-04-09 | Stop reason: SDUPTHER

## 2019-01-14 RX ORDER — LOSARTAN POTASSIUM 50 MG/1
50 TABLET ORAL DAILY
Qty: 30 TABLET | Refills: 2 | Status: SHIPPED | OUTPATIENT
Start: 2019-01-14 | End: 2019-04-09 | Stop reason: SDUPTHER

## 2019-01-14 RX ORDER — ROSUVASTATIN CALCIUM 20 MG/1
20 TABLET, COATED ORAL NIGHTLY
Qty: 30 TABLET | Refills: 2 | Status: SHIPPED | OUTPATIENT
Start: 2019-01-14 | End: 2019-04-09 | Stop reason: SDUPTHER

## 2019-01-20 ASSESSMENT — ENCOUNTER SYMPTOMS
ABDOMINAL PAIN: 0
BLOOD IN STOOL: 0
CHEST TIGHTNESS: 0
SHORTNESS OF BREATH: 0
COUGH: 0

## 2019-04-09 ENCOUNTER — OFFICE VISIT (OUTPATIENT)
Dept: FAMILY MEDICINE CLINIC | Age: 64
End: 2019-04-09
Payer: COMMERCIAL

## 2019-04-09 VITALS
DIASTOLIC BLOOD PRESSURE: 78 MMHG | HEART RATE: 85 BPM | SYSTOLIC BLOOD PRESSURE: 120 MMHG | HEIGHT: 61 IN | OXYGEN SATURATION: 96 % | WEIGHT: 131 LBS | BODY MASS INDEX: 24.73 KG/M2 | RESPIRATION RATE: 14 BRPM

## 2019-04-09 DIAGNOSIS — E78.5 HYPERLIPIDEMIA, UNSPECIFIED HYPERLIPIDEMIA TYPE: ICD-10-CM

## 2019-04-09 DIAGNOSIS — K21.9 GASTROESOPHAGEAL REFLUX DISEASE WITHOUT ESOPHAGITIS: ICD-10-CM

## 2019-04-09 DIAGNOSIS — F32.A DEPRESSION, UNSPECIFIED DEPRESSION TYPE: ICD-10-CM

## 2019-04-09 DIAGNOSIS — G47.00 INSOMNIA, UNSPECIFIED TYPE: ICD-10-CM

## 2019-04-09 DIAGNOSIS — M05.731 RHEUMATOID ARTHRITIS INVOLVING RIGHT WRIST WITH POSITIVE RHEUMATOID FACTOR (HCC): ICD-10-CM

## 2019-04-09 DIAGNOSIS — D50.8 OTHER IRON DEFICIENCY ANEMIA: ICD-10-CM

## 2019-04-09 DIAGNOSIS — I10 ESSENTIAL HYPERTENSION: Primary | ICD-10-CM

## 2019-04-09 DIAGNOSIS — F41.9 ANXIETY: ICD-10-CM

## 2019-04-09 PROCEDURE — G8427 DOCREV CUR MEDS BY ELIG CLIN: HCPCS | Performed by: FAMILY MEDICINE

## 2019-04-09 PROCEDURE — 3017F COLORECTAL CA SCREEN DOC REV: CPT | Performed by: FAMILY MEDICINE

## 2019-04-09 PROCEDURE — 1036F TOBACCO NON-USER: CPT | Performed by: FAMILY MEDICINE

## 2019-04-09 PROCEDURE — 99214 OFFICE O/P EST MOD 30 MIN: CPT | Performed by: FAMILY MEDICINE

## 2019-04-09 PROCEDURE — G8420 CALC BMI NORM PARAMETERS: HCPCS | Performed by: FAMILY MEDICINE

## 2019-04-09 RX ORDER — TRAZODONE HYDROCHLORIDE 100 MG/1
TABLET ORAL
Qty: 30 TABLET | Refills: 2 | Status: SHIPPED | OUTPATIENT
Start: 2019-04-09 | End: 2019-07-02 | Stop reason: SDUPTHER

## 2019-04-09 RX ORDER — PANTOPRAZOLE SODIUM 20 MG/1
20 TABLET, DELAYED RELEASE ORAL DAILY
Qty: 30 TABLET | Refills: 2 | Status: CANCELLED | OUTPATIENT
Start: 2019-04-09

## 2019-04-09 RX ORDER — CITALOPRAM 40 MG/1
40 TABLET ORAL DAILY
Qty: 30 TABLET | Refills: 2 | Status: SHIPPED | OUTPATIENT
Start: 2019-04-09 | End: 2019-07-02 | Stop reason: SDUPTHER

## 2019-04-09 RX ORDER — LOSARTAN POTASSIUM 50 MG/1
50 TABLET ORAL DAILY
Qty: 30 TABLET | Refills: 2 | Status: SHIPPED | OUTPATIENT
Start: 2019-04-09 | End: 2019-07-02 | Stop reason: SDUPTHER

## 2019-04-09 RX ORDER — GEMFIBROZIL 600 MG/1
600 TABLET, FILM COATED ORAL
Qty: 60 TABLET | Refills: 2 | Status: SHIPPED | OUTPATIENT
Start: 2019-04-09 | End: 2019-07-02 | Stop reason: SDUPTHER

## 2019-04-09 RX ORDER — ROSUVASTATIN CALCIUM 20 MG/1
20 TABLET, COATED ORAL NIGHTLY
Qty: 30 TABLET | Refills: 2 | Status: SHIPPED | OUTPATIENT
Start: 2019-04-09 | End: 2019-07-02 | Stop reason: SDUPTHER

## 2019-04-09 RX ORDER — ALBUTEROL SULFATE 90 UG/1
AEROSOL, METERED RESPIRATORY (INHALATION)
Qty: 8.5 G | Refills: 0 | Status: CANCELLED | OUTPATIENT
Start: 2019-04-09

## 2019-04-09 RX ORDER — ICOSAPENT ETHYL 1000 MG/1
2 CAPSULE ORAL 2 TIMES DAILY
Qty: 28 CAPSULE | Refills: 0 | Status: SHIPPED | OUTPATIENT
Start: 2019-04-09 | End: 2019-06-19 | Stop reason: SDUPTHER

## 2019-04-09 RX ORDER — LEVOMEFOLATE CALCIUM 7.5 MG TABLET
7.5 TABLET DAILY
Qty: 30 TABLET | Refills: 1 | Status: SHIPPED | OUTPATIENT
Start: 2019-04-09 | End: 2022-07-26 | Stop reason: ALTCHOICE

## 2019-04-09 RX ORDER — HYDROXYZINE PAMOATE 25 MG/1
25 CAPSULE ORAL 2 TIMES DAILY PRN
Qty: 20 CAPSULE | Refills: 1 | Status: SHIPPED | OUTPATIENT
Start: 2019-04-09 | End: 2019-04-23

## 2019-04-09 ASSESSMENT — PATIENT HEALTH QUESTIONNAIRE - PHQ9
SUM OF ALL RESPONSES TO PHQ QUESTIONS 1-9: 0
2. FEELING DOWN, DEPRESSED OR HOPELESS: 0
1. LITTLE INTEREST OR PLEASURE IN DOING THINGS: 0
SUM OF ALL RESPONSES TO PHQ QUESTIONS 1-9: 0
SUM OF ALL RESPONSES TO PHQ9 QUESTIONS 1 & 2: 0

## 2019-04-09 ASSESSMENT — ENCOUNTER SYMPTOMS
SHORTNESS OF BREATH: 0
COUGH: 0
ABDOMINAL PAIN: 0
CHEST TIGHTNESS: 0
BLOOD IN STOOL: 0

## 2019-04-09 NOTE — PROGRESS NOTES
SUBJECTIVE:  Panchito Laird   1955   female   Allergies   Allergen Reactions    Asa [Aspirin]     Atorvastatin Diarrhea    Cephalexin     Naproxen      Sleepy, GI upset       Chief Complaint   Patient presents with    Hyperlipidemia    Hypertension        Patient Active Problem List    Diagnosis Date Noted    Hemorrhoid 04/28/2017    Iron deficiency anemia 04/28/2017    Essential hypertension 10/25/2016    MGUS (monoclonal gammopathy of unknown significance) 10/07/2016    Macrocytic anemia 10/07/2016    Insomnia 02/07/2016    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 ([facility] [address] 11/02/2015    Pure hypercholesterolemia 11/02/2015    Hypertriglyceridemia, familial 11/02/2015    Concussion with brief (less than one hour) loss of consciousness 09/01/2015    Concussion with < 1 hr loss of consciousness 09/01/2015    Fracture of multiple ribs 08/27/2015    Gastroduodenitis 07/01/2015    Heterozygous MTHFR mutation [id number] ([facility] [address] 12/16/2014    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[facility] [address] 12/16/2014    Vitamin D deficiency 12/16/2014     Overview:   Replaced inactive diagnosis term via diagnosis import      Allergic rhinitis 04/28/2014    Menopause 04/28/2014    BP (high blood pressure) 12/15/2013    Steatohepatitis     RA (rheumatoid arthritis) ([facility] [address]     Anxiety     Hyperlipidemia     Synovial cyst 05/14/2013    Arthritis of hand, degenerative 02/14/2013    Alopecia 11/27/2012    Loss of hair 11/27/2012    Dry mouth 11/13/2012    Rash 11/13/2012    Dry eyes 11/13/2012    Joint swelling 11/13/2012    Elevated rheumatoid factor 11/13/2012       HPI   Pt is here today for fu on depression/anxiety, hyperlipidemia, HTN, RA, intermittent anemia, and GERD. She has been stable on current mgt. She has not been taking Protonix and managing with OTC antacids as needed (1-2 times weekly). She is co having trouble with her  and has been getting anxious in the evenings. Sleeping ok. No other GI sx. Denies CP,SOB or myalgias. No ha,change in vision or neurologic sx. She has not been on any tx for RA and has no sx. Past Medical History:   Diagnosis Date    Allergic     Anemia     Anxiety     Hyperlipidemia     Hypertension     RA (rheumatoid arthritis) ([facility] [address]     Steatohepatitis      Social History     Socioeconomic History    Marital status:      Spouse name: Not on file    Number of children: Not on file    Years of education: Not on file    Highest education level: Not on file   Occupational History    Not on file   Social Needs    Financial resource strain: Not on file    Food insecurity:     Worry: Not on file     Inability: Not on file    Transportation needs:     Medical: Not on file     Non-medical: Not on file   Tobacco Use    Smoking status: Former Smoker     Packs/day: 0.00     Years: 3.00     Pack years: 0.00     Types: Cigarettes     Last attempt to quit:      Years since quittin.2    Smokeless tobacco: Never Used    Tobacco comment: 1-2 cigs/day   Substance and Sexual Activity    Alcohol use:  Yes     Alcohol/week: 0.0 oz     Comment: 1-2 drinks daily    Drug use: No    Sexual activity: Not on file   Lifestyle    Physical activity:     Days per week: Not on file     Minutes per session: Not on file    Stress: Not on file   Relationships    Social connections:     Talks on phone: Not on file     Gets together: Not on file     Attends Jain service: Not on file     Active member of club or organization: Not on file     Attends meetings of clubs or organizations: Not on file     Relationship status: Not on file    Intimate partner violence:     Fear of current or ex partner: Not on file     Emotionally abused: Not on file     Physically abused: Not on file     Forced sexual activity: Not on file   Other Topics Concern    Not on file   Social History Narrative    Not on file     Family History   Problem Relation Age of Onset    Diabetes Sister     Cancer Mother         breast cancer        Review of Systems   Constitutional: Negative for activity change, appetite change and unexpected weight change. Respiratory: Negative for cough, chest tightness and shortness of breath. Cardiovascular: Negative for chest pain, palpitations and leg swelling. Gastrointestinal: Negative for abdominal pain and blood in stool. Endocrine: Negative for cold intolerance and heat intolerance. Musculoskeletal: Negative for arthralgias and myalgias. Skin: Negative for rash. Neurological: Negative for light-headedness and headaches. Hematological: Negative for adenopathy. Does not bruise/bleed easily. Psychiatric/Behavioral: Negative for dysphoric mood, sleep disturbance and suicidal ideas. The patient is nervous/anxious. OBJECTIVE:  /78   Pulse 85   Resp 14   Ht 5' 1\" (1.549 m)   Wt 131 lb (59.4 kg)   SpO2 96%   BMI 24.75 kg/m²   Physical Exam   Constitutional: She is oriented to person, place, and time. She appears well-developed and well-nourished. Eyes: Pupils are equal, round, and reactive to light. EOM are normal.   Neck: Normal range of motion. Neck supple. No JVD present. No thyromegaly present. Cardiovascular: Normal rate and regular rhythm. Pulmonary/Chest: Effort normal and breath sounds normal.   Abdominal: Soft. Bowel sounds are normal. There is no tenderness. Neurological: She is alert and oriented to person, place, and time. Skin: No rash noted. Psychiatric: She has a normal mood and affect. Her behavior is normal. Thought content normal.   Nursing note and vitals reviewed. ASSESSMENT/PLAN:    1. Essential hypertension  Refill meds  Intermittent ambulatory BP checks  labs    2. Hyperlipidemia, unspecified hyperlipidemia type  continue current mgt  Discussed appr diet  labs    3. Anxiety  Continue Celexa  Stress mgt  Prn Vistaril- sedation discussed    4.  Insomnia, unspecified type  appr sleep hygiene  Refill Trazodone    5. 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 (HCC)  Pt has no sx    6. Other iron deficiency anemia  Will monitor  Labs today    7. Depression, unspecified depression type  Refill Celexa  Stress mgt    8. Gastroesophageal reflux disease without esophagitis  GERD precautions  Prn Pepcid      No orders of the defined types were placed in this encounter. Current Outpatient Medications   Medication Sig Dispense Refill    citalopram (CELEXA) 40 MG tablet Take 1 tablet by mouth daily 30 tablet 2    gemfibrozil (LOPID) 600 MG tablet Take 1 tablet by mouth 2 times daily (before meals) 60 tablet 2    Icosapent Ethyl (VASCEPA) 1 g CAPS capsule Take 2 capsules by mouth 2 times daily 28 capsule 0    losartan (COZAAR) 50 MG tablet Take 1 tablet by mouth daily 30 tablet 2    methylfolate (DEPLIN) 7.5 MG TABS tablet Take 7.5 tablets by mouth daily 30 tablet 1    rosuvastatin (CRESTOR) 20 MG tablet Take 1 tablet by mouth nightly 30 tablet 2    traZODone (DESYREL) 100 MG tablet 1 po qhs 30 tablet 2    hydrOXYzine (VISTARIL) 25 MG capsule Take 1 capsule by mouth 2 times daily as needed for Itching 20 capsule 1    pantoprazole (PROTONIX) 20 MG tablet TAKE 1 TABLET BY MOUTH DAILY 30 tablet 2    PROAIR  (90 BASE) MCG/ACT inhaler INHALE 2 PUFFS INTO THE LUNGS EVERY 6 HOURS AS NEEDED FOR WHEEZING 8.5 g 0    Biotin 10 MG tablet Take 10 mg by mouth daily      Cholecalciferol (VITAMIN D) 2000 UNITS CAPS capsule Take by mouth      fluticasone (FLONASE) 50 MCG/ACT nasal spray 1 spray by Nasal route daily 1 Bottle 2    vitamin B-12 (CYANOCOBALAMIN) 100 MCG tablet Take 50 mcg by mouth daily      Pyridoxine HCl (VITAMIN B-6) 50 MG tablet Take 50 mg by mouth daily       No current facility-administered medications for this visit. fu GYN exam advised  Shingrix advised  Return in about 3 months (around 7/9/2019).     Alex Chavez MD

## 2019-04-10 ENCOUNTER — TELEPHONE (OUTPATIENT)
Dept: INTERNAL MEDICINE CLINIC | Age: 64
End: 2019-04-10

## 2019-04-10 NOTE — TELEPHONE ENCOUNTER
PA submitted for L-Methylfolate 7.5MG OR TABS  Key: [address] - PA Case ID: [id number]   Status: pending

## 2019-04-11 NOTE — TELEPHONE ENCOUNTER
Received DENIAL for L-Methylfolate 7.5MG OR TABS as it is a PLAN EXCLUSION. Denial letter attached. Please advise patient. Thank you.

## 2019-04-15 NOTE — TELEPHONE ENCOUNTER
Called the patient and discussed labs. She sometimes forgets to take all the supplements. She states that she has plenty of OTC Folic acid. Dr. Slade Schultz has her on a good regimen. She will try to do better with diet and taking her supplements. She states she only eats once or twice a day.  Just gets busy and forgets to eat

## 2019-05-20 ENCOUNTER — TELEPHONE (OUTPATIENT)
Dept: WOMENS IMAGING | Age: 64
End: 2019-05-20

## 2019-05-20 NOTE — TELEPHONE ENCOUNTER
Called LM for patient to see if she was ready to have genetic testing. Requested patient to call [facility] with decision.

## 2019-06-19 RX ORDER — ICOSAPENT ETHYL 1000 MG/1
CAPSULE ORAL
Qty: 60 CAPSULE | Refills: 0 | Status: SHIPPED | OUTPATIENT
Start: 2019-06-19 | End: 2019-07-02 | Stop reason: SDUPTHER

## 2019-07-02 ENCOUNTER — OFFICE VISIT (OUTPATIENT)
Dept: FAMILY MEDICINE CLINIC | Age: 64
End: 2019-07-02
Payer: COMMERCIAL

## 2019-07-02 VITALS
HEART RATE: 86 BPM | DIASTOLIC BLOOD PRESSURE: 80 MMHG | OXYGEN SATURATION: 98 % | BODY MASS INDEX: 24.64 KG/M2 | TEMPERATURE: 98 F | SYSTOLIC BLOOD PRESSURE: 126 MMHG | WEIGHT: 130.4 LBS

## 2019-07-02 DIAGNOSIS — I10 ESSENTIAL HYPERTENSION: ICD-10-CM

## 2019-07-02 DIAGNOSIS — F41.9 ANXIETY: ICD-10-CM

## 2019-07-02 DIAGNOSIS — E78.5 HYPERLIPIDEMIA, UNSPECIFIED HYPERLIPIDEMIA TYPE: Primary | ICD-10-CM

## 2019-07-02 DIAGNOSIS — G47.00 INSOMNIA, UNSPECIFIED TYPE: ICD-10-CM

## 2019-07-02 DIAGNOSIS — R73.09 SUGAR BLOOD LEVEL INCREASED: ICD-10-CM

## 2019-07-02 DIAGNOSIS — Z12.39 BREAST CANCER SCREENING: ICD-10-CM

## 2019-07-02 DIAGNOSIS — F32.A DEPRESSION, UNSPECIFIED DEPRESSION TYPE: ICD-10-CM

## 2019-07-02 PROCEDURE — G8427 DOCREV CUR MEDS BY ELIG CLIN: HCPCS | Performed by: FAMILY MEDICINE

## 2019-07-02 PROCEDURE — 1036F TOBACCO NON-USER: CPT | Performed by: FAMILY MEDICINE

## 2019-07-02 PROCEDURE — G8420 CALC BMI NORM PARAMETERS: HCPCS | Performed by: FAMILY MEDICINE

## 2019-07-02 PROCEDURE — 3017F COLORECTAL CA SCREEN DOC REV: CPT | Performed by: FAMILY MEDICINE

## 2019-07-02 PROCEDURE — 99214 OFFICE O/P EST MOD 30 MIN: CPT | Performed by: FAMILY MEDICINE

## 2019-07-02 RX ORDER — CITALOPRAM 40 MG/1
40 TABLET ORAL DAILY
Qty: 30 TABLET | Refills: 2 | Status: SHIPPED | OUTPATIENT
Start: 2019-07-02 | End: 2019-09-30 | Stop reason: SDUPTHER

## 2019-07-02 RX ORDER — TRAZODONE HYDROCHLORIDE 100 MG/1
TABLET ORAL
Qty: 30 TABLET | Refills: 2 | Status: SHIPPED | OUTPATIENT
Start: 2019-07-02 | End: 2019-09-30 | Stop reason: SDUPTHER

## 2019-07-02 RX ORDER — ROSUVASTATIN CALCIUM 20 MG/1
20 TABLET, COATED ORAL NIGHTLY
Qty: 30 TABLET | Refills: 2 | Status: SHIPPED | OUTPATIENT
Start: 2019-07-02 | End: 2019-09-30 | Stop reason: SDUPTHER

## 2019-07-02 RX ORDER — GEMFIBROZIL 600 MG/1
600 TABLET, FILM COATED ORAL
Qty: 60 TABLET | Refills: 2 | Status: SHIPPED | OUTPATIENT
Start: 2019-07-02 | End: 2019-09-30 | Stop reason: SDUPTHER

## 2019-07-02 RX ORDER — BUPROPION HYDROCHLORIDE 150 MG/1
150 TABLET ORAL EVERY MORNING
Qty: 30 TABLET | Refills: 0 | Status: SHIPPED | OUTPATIENT
Start: 2019-07-02 | End: 2019-09-30 | Stop reason: SDUPTHER

## 2019-07-02 RX ORDER — LOSARTAN POTASSIUM 50 MG/1
50 TABLET ORAL DAILY
Qty: 30 TABLET | Refills: 2 | Status: SHIPPED | OUTPATIENT
Start: 2019-07-02 | End: 2019-09-30 | Stop reason: SDUPTHER

## 2019-07-02 RX ORDER — ICOSAPENT ETHYL 1000 MG/1
CAPSULE ORAL
Qty: 120 CAPSULE | Refills: 2 | Status: SHIPPED | OUTPATIENT
Start: 2019-07-02 | End: 2019-09-30 | Stop reason: SDUPTHER

## 2019-07-02 RX ORDER — LEVOMEFOLATE CALCIUM 7.5 MG TABLET
7.5 TABLET DAILY
Qty: 30 TABLET | Refills: 1 | Status: CANCELLED | OUTPATIENT
Start: 2019-07-02

## 2019-07-02 ASSESSMENT — ENCOUNTER SYMPTOMS
DIARRHEA: 0
CHEST TIGHTNESS: 0
BLOOD IN STOOL: 0
CONSTIPATION: 0
SHORTNESS OF BREATH: 0
COUGH: 0
ABDOMINAL PAIN: 0

## 2019-07-02 NOTE — PROGRESS NOTES
History   Problem Relation Age of Onset    Diabetes Sister     Cancer Mother         breast cancer        Review of Systems   Constitutional: Negative for activity change, appetite change and unexpected weight change. Respiratory: Negative for cough, chest tightness and shortness of breath. Cardiovascular: Negative for chest pain, palpitations and leg swelling. Gastrointestinal: Negative for abdominal pain, blood in stool, constipation and diarrhea. Musculoskeletal: Negative for arthralgias and myalgias. Skin: Negative for rash. Neurological: Negative for light-headedness and headaches. Hematological: Negative for adenopathy. Does not bruise/bleed easily. Psychiatric/Behavioral: Positive for dysphoric mood and sleep disturbance. Negative for suicidal ideas. The patient is not nervous/anxious. OBJECTIVE:  /80   Pulse 86   Temp 98 °F (36.7 °C)   Wt 130 lb 6.4 oz (59.1 kg)   SpO2 98%   BMI 24.64 kg/m²   Physical Exam   Constitutional: She is oriented to person, place, and time. She appears well-developed and well-nourished. Eyes: Pupils are equal, round, and reactive to light. EOM are normal.   Neck: Normal range of motion. Neck supple. No thyromegaly present. Cardiovascular: Normal rate and regular rhythm. Pulmonary/Chest: Effort normal and breath sounds normal.   Abdominal: Soft. Bowel sounds are normal. There is no tenderness. Neurological: She is alert and oriented to person, place, and time. Skin: No rash noted. Psychiatric: She has a normal mood and affect. Her behavior is normal. Thought content normal.   Nursing note and vitals reviewed. ASSESSMENT/PLAN:    1. Hyperlipidemia, unspecified hyperlipidemia type  Continue current mgt  Encouraged appr diet and avoiding alcohol  Fu labs  - Lipid Panel; Future  - Comprehensive Metabolic Panel; Future    2. Sugar blood level increased  Reviewed labs  Discussed appr diet mgt  - Hemoglobin A1C; Future    3.  Breast

## 2019-09-30 ENCOUNTER — OFFICE VISIT (OUTPATIENT)
Dept: FAMILY MEDICINE CLINIC | Age: 64
End: 2019-09-30
Payer: COMMERCIAL

## 2019-09-30 VITALS
OXYGEN SATURATION: 95 % | DIASTOLIC BLOOD PRESSURE: 80 MMHG | BODY MASS INDEX: 24.45 KG/M2 | SYSTOLIC BLOOD PRESSURE: 120 MMHG | WEIGHT: 129.4 LBS | HEART RATE: 85 BPM

## 2019-09-30 DIAGNOSIS — Z23 NEED FOR VACCINATION: Primary | ICD-10-CM

## 2019-09-30 DIAGNOSIS — I10 ESSENTIAL HYPERTENSION: ICD-10-CM

## 2019-09-30 DIAGNOSIS — F41.9 ANXIETY: ICD-10-CM

## 2019-09-30 DIAGNOSIS — E78.5 HYPERLIPIDEMIA, UNSPECIFIED HYPERLIPIDEMIA TYPE: ICD-10-CM

## 2019-09-30 DIAGNOSIS — F32.A DEPRESSION, UNSPECIFIED DEPRESSION TYPE: ICD-10-CM

## 2019-09-30 DIAGNOSIS — G47.00 INSOMNIA, UNSPECIFIED TYPE: ICD-10-CM

## 2019-09-30 DIAGNOSIS — M05.731 RHEUMATOID ARTHRITIS INVOLVING RIGHT WRIST WITH POSITIVE RHEUMATOID FACTOR (HCC): ICD-10-CM

## 2019-09-30 PROCEDURE — 90471 IMMUNIZATION ADMIN: CPT | Performed by: FAMILY MEDICINE

## 2019-09-30 PROCEDURE — 1036F TOBACCO NON-USER: CPT | Performed by: FAMILY MEDICINE

## 2019-09-30 PROCEDURE — 3017F COLORECTAL CA SCREEN DOC REV: CPT | Performed by: FAMILY MEDICINE

## 2019-09-30 PROCEDURE — 99214 OFFICE O/P EST MOD 30 MIN: CPT | Performed by: FAMILY MEDICINE

## 2019-09-30 PROCEDURE — 90686 IIV4 VACC NO PRSV 0.5 ML IM: CPT | Performed by: FAMILY MEDICINE

## 2019-09-30 PROCEDURE — G8427 DOCREV CUR MEDS BY ELIG CLIN: HCPCS | Performed by: FAMILY MEDICINE

## 2019-09-30 PROCEDURE — G8420 CALC BMI NORM PARAMETERS: HCPCS | Performed by: FAMILY MEDICINE

## 2019-09-30 RX ORDER — CITALOPRAM 40 MG/1
40 TABLET ORAL DAILY
Qty: 30 TABLET | Refills: 2 | Status: SHIPPED | OUTPATIENT
Start: 2019-09-30 | End: 2020-01-08 | Stop reason: SDUPTHER

## 2019-09-30 RX ORDER — GEMFIBROZIL 600 MG/1
600 TABLET, FILM COATED ORAL
Qty: 60 TABLET | Refills: 2 | Status: SHIPPED | OUTPATIENT
Start: 2019-09-30 | End: 2020-01-08 | Stop reason: SDUPTHER

## 2019-09-30 RX ORDER — TRAZODONE HYDROCHLORIDE 100 MG/1
TABLET ORAL
Qty: 30 TABLET | Refills: 2 | Status: SHIPPED | OUTPATIENT
Start: 2019-09-30 | End: 2020-01-08 | Stop reason: SDUPTHER

## 2019-09-30 RX ORDER — ICOSAPENT ETHYL 1000 MG/1
CAPSULE ORAL
Qty: 120 CAPSULE | Refills: 2 | Status: SHIPPED | OUTPATIENT
Start: 2019-09-30 | End: 2020-01-08 | Stop reason: SDUPTHER

## 2019-09-30 RX ORDER — LOSARTAN POTASSIUM 50 MG/1
50 TABLET ORAL DAILY
Qty: 30 TABLET | Refills: 2 | Status: SHIPPED | OUTPATIENT
Start: 2019-09-30 | End: 2020-01-08 | Stop reason: SDUPTHER

## 2019-09-30 RX ORDER — ROSUVASTATIN CALCIUM 20 MG/1
20 TABLET, COATED ORAL NIGHTLY
Qty: 30 TABLET | Refills: 2 | Status: SHIPPED | OUTPATIENT
Start: 2019-09-30 | End: 2020-01-08 | Stop reason: SDUPTHER

## 2019-09-30 RX ORDER — BUPROPION HYDROCHLORIDE 150 MG/1
150 TABLET ORAL EVERY MORNING
Qty: 30 TABLET | Refills: 0 | Status: SHIPPED | OUTPATIENT
Start: 2019-09-30 | End: 2020-01-08 | Stop reason: SDUPTHER

## 2019-09-30 ASSESSMENT — ENCOUNTER SYMPTOMS
COUGH: 0
DIARRHEA: 0
BLOOD IN STOOL: 0
CHEST TIGHTNESS: 0
CONSTIPATION: 0
SHORTNESS OF BREATH: 0
ABDOMINAL PAIN: 0

## 2020-01-08 ENCOUNTER — OFFICE VISIT (OUTPATIENT)
Dept: FAMILY MEDICINE CLINIC | Age: 65
End: 2020-01-08
Payer: COMMERCIAL

## 2020-01-08 VITALS
SYSTOLIC BLOOD PRESSURE: 90 MMHG | WEIGHT: 127.4 LBS | HEART RATE: 51 BPM | DIASTOLIC BLOOD PRESSURE: 55 MMHG | OXYGEN SATURATION: 96 % | BODY MASS INDEX: 24.07 KG/M2

## 2020-01-08 PROCEDURE — G8427 DOCREV CUR MEDS BY ELIG CLIN: HCPCS | Performed by: FAMILY MEDICINE

## 2020-01-08 PROCEDURE — 99214 OFFICE O/P EST MOD 30 MIN: CPT | Performed by: FAMILY MEDICINE

## 2020-01-08 PROCEDURE — G8482 FLU IMMUNIZE ORDER/ADMIN: HCPCS | Performed by: FAMILY MEDICINE

## 2020-01-08 PROCEDURE — G8420 CALC BMI NORM PARAMETERS: HCPCS | Performed by: FAMILY MEDICINE

## 2020-01-08 PROCEDURE — 1036F TOBACCO NON-USER: CPT | Performed by: FAMILY MEDICINE

## 2020-01-08 PROCEDURE — 3017F COLORECTAL CA SCREEN DOC REV: CPT | Performed by: FAMILY MEDICINE

## 2020-01-08 RX ORDER — CITALOPRAM 40 MG/1
40 TABLET ORAL DAILY
Qty: 30 TABLET | Refills: 2 | Status: SHIPPED | OUTPATIENT
Start: 2020-01-08 | End: 2020-04-07 | Stop reason: SDUPTHER

## 2020-01-08 RX ORDER — GEMFIBROZIL 600 MG/1
600 TABLET, FILM COATED ORAL
Qty: 60 TABLET | Refills: 2 | Status: SHIPPED | OUTPATIENT
Start: 2020-01-08 | End: 2020-04-07 | Stop reason: SDUPTHER

## 2020-01-08 RX ORDER — LOSARTAN POTASSIUM 50 MG/1
50 TABLET ORAL DAILY
Qty: 30 TABLET | Refills: 2 | Status: SHIPPED | OUTPATIENT
Start: 2020-01-08 | End: 2020-04-07 | Stop reason: SDUPTHER

## 2020-01-08 RX ORDER — BUPROPION HYDROCHLORIDE 150 MG/1
150 TABLET ORAL EVERY MORNING
Qty: 30 TABLET | Refills: 2 | Status: SHIPPED | OUTPATIENT
Start: 2020-01-08 | End: 2020-04-07 | Stop reason: SDUPTHER

## 2020-01-08 RX ORDER — TRAZODONE HYDROCHLORIDE 100 MG/1
TABLET ORAL
Qty: 30 TABLET | Refills: 2 | Status: SHIPPED | OUTPATIENT
Start: 2020-01-08 | End: 2020-04-07 | Stop reason: SDUPTHER

## 2020-01-08 RX ORDER — ICOSAPENT ETHYL 1000 MG/1
CAPSULE ORAL
Qty: 120 CAPSULE | Refills: 2 | Status: SHIPPED | OUTPATIENT
Start: 2020-01-08 | End: 2020-04-07 | Stop reason: SDUPTHER

## 2020-01-08 RX ORDER — ROSUVASTATIN CALCIUM 20 MG/1
20 TABLET, COATED ORAL NIGHTLY
Qty: 30 TABLET | Refills: 2 | Status: SHIPPED | OUTPATIENT
Start: 2020-01-08 | End: 2020-04-07 | Stop reason: SDUPTHER

## 2020-01-13 ASSESSMENT — ENCOUNTER SYMPTOMS
BLOOD IN STOOL: 0
ABDOMINAL PAIN: 0
SHORTNESS OF BREATH: 0
CHEST TIGHTNESS: 0
COUGH: 0

## 2020-01-13 NOTE — PROGRESS NOTES
neurologic sx. She has recently  her  and moved to an [facility] by herself. Reports she has not been eating well . Doing well on anxiety/depression mgt. Sleeping well on Trazodone. Past Medical History:   Diagnosis Date    Allergic     Anemia     Anxiety     Hyperlipidemia     Hypertension     RA [facility]he[facility]oid arthritis) ([facility] [address]     Steatohepatitis      Social History     Socioeconomic History    Marital status:      Spouse name: Not on file    Number of children: Not on file    Years of education: Not on file    Highest education level: Not on file   Occupational History    Not on file   Social Needs    Financial resource strain: Not on file    Food insecurity:     Worry: Not on file     Inability: Not on file    Transportation needs:     Medical: Not on file     Non-medical: Not on file   Tobacco Use    Smoking status: Former Smoker     Packs/day: 0.25     Years: 5.00     Pack years: 1.25     Types: Cigarettes     Last attempt to quit: 2005     Years since quitting: 15.0    Smokeless tobacco: Never Used    Tobacco comment: 1-2 cigs/day   Substance and Sexual Activity    Alcohol use:  Yes     Alcohol/week: 0.0 standard drinks     Comment: 1-2 drinks daily    Drug use: No    Sexual activity: Not on file   Lifestyle    Physical activity:     Days per week: Not on file     Minutes per session: Not on file    Stress: Not on file   Relationships    Social connections:     Talks on phone: Not on file     Gets together: Not on file     Attends Scientology service: Not on file     Active member of club or organization: Not on file     Attends meetings of clubs or organizations: Not on file     Relationship status: Not on file    Intimate partner violence:     Fear of current or ex partner: Not on file     Emotionally abused: Not on file     Physically abused: Not on file     Forced sexual activity: Not on file   Other Topics Concern    Not on file   Social History Narrative    Not on diet  - Comprehensive Metabolic Panel; Future  - CK; Future  - CBC Auto Differential; Future  - Lipid Panel; Future    2. Anxiety  Stress mgt'  refill meds    3. Insomnia, unspecified type  appr sleep hygiene  Refill Trazodone    4. Depression, unspecified depression type  Stress mgt  Refill meds    5. Essential hypertension  cotninue current tx  Intermittent ambulatory BP checks  labs    6.  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 (HCC)  Aleve with food  Back to rheumatology      Orders Placed This Encounter   Procedures    Comprehensive Metabolic Panel     Standing Status:   Future     Standing Expiration Date:   1/27/2021    CK     Standing Status:   Future     Standing Expiration Date:   1/27/2021    CBC Auto Differential     Standing Status:   Future     Standing Expiration Date:   1/27/2021    Lipid Panel     Standing Status:   Future     Standing Expiration Date:   1/27/2021     Order Specific Question:   Is Patient Fasting?/# of Hours     Answer:   10 hrs     Current Outpatient Medications   Medication Sig Dispense Refill    citalopram (CELEXA) 40 MG tablet Take 1 tablet by mouth daily 30 tablet 2    gemfibrozil (LOPID) 600 MG tablet Take 1 tablet by mouth 2 times daily (before meals) 60 tablet 2    losartan (COZAAR) 50 MG tablet Take 1 tablet by mouth daily 30 tablet 2    rosuvastatin (CRESTOR) 20 MG tablet Take 1 tablet by mouth nightly 30 tablet 2    traZODone (DESYREL) 100 MG tablet 1 po qhs 30 tablet 2    Icosapent Ethyl (VASCEPA) 1 g CAPS capsule TAKE 2 CAPSULES BY MOUTH TWICE DAILY 120 capsule 2    buPROPion (WELLBUTRIN XL) 150 MG extended release tablet Take 1 tablet by mouth every morning 30 tablet 2    methylfolate (DEPLIN) 7.5 MG TABS tablet Take 7.5 tablets by mouth daily 30 tablet 1    pantoprazole (PROTONIX) 20 MG tablet TAKE 1 TABLET BY MOUTH DAILY 30 tablet 2    PROAIR  (90 BASE) MCG/ACT inhaler INHALE 2 PUFFS INTO THE LUNGS EVERY 6 HOURS AS NEEDED FOR WHEEZING 8.5 g 0    Biotin 10 MG tablet Take 10 mg by mouth daily      Cholecalciferol (VITAMIN D) 2000 UNITS CAPS capsule Take by mouth      fluticasone (FLONASE) 50 MCG/ACT nasal spray 1 spray by Nasal route daily 1 Bottle 2    vitamin B-12 (CYANOCOBALAMIN) 100 MCG tablet Take 50 mcg by mouth daily      Pyridoxine HCl (VITAMIN B-6) 50 MG tablet Take 50 mg by mouth daily       No current facility-administered medications for this visit. pap and GYN exam advised  Shingrix advised     Return in about 3 months (around 4/8/2020), or if symptoms worsen or fail to improve.     Bernard Ackerman MD

## 2020-04-07 ENCOUNTER — VIRTUAL VISIT (OUTPATIENT)
Dept: FAMILY MEDICINE CLINIC | Age: 65
End: 2020-04-07

## 2020-04-07 VITALS — HEIGHT: 61 IN | BODY MASS INDEX: 24.07 KG/M2

## 2020-04-07 PROCEDURE — 99213 OFFICE O/P EST LOW 20 MIN: CPT | Performed by: FAMILY MEDICINE

## 2020-04-07 RX ORDER — GEMFIBROZIL 600 MG/1
600 TABLET, FILM COATED ORAL
Qty: 60 TABLET | Refills: 0 | Status: SHIPPED | OUTPATIENT
Start: 2020-04-07 | End: 2020-05-05 | Stop reason: SDUPTHER

## 2020-04-07 RX ORDER — ICOSAPENT ETHYL 1000 MG/1
CAPSULE ORAL
Qty: 120 CAPSULE | Refills: 0 | Status: SHIPPED | OUTPATIENT
Start: 2020-04-07 | End: 2020-05-05 | Stop reason: SDUPTHER

## 2020-04-07 RX ORDER — CITALOPRAM 40 MG/1
40 TABLET ORAL DAILY
Qty: 30 TABLET | Refills: 0 | Status: SHIPPED | OUTPATIENT
Start: 2020-04-07 | End: 2020-05-05 | Stop reason: SDUPTHER

## 2020-04-07 RX ORDER — TRAZODONE HYDROCHLORIDE 100 MG/1
TABLET ORAL
Qty: 30 TABLET | Refills: 0 | Status: SHIPPED | OUTPATIENT
Start: 2020-04-07 | End: 2020-05-05 | Stop reason: SDUPTHER

## 2020-04-07 RX ORDER — LOSARTAN POTASSIUM 50 MG/1
50 TABLET ORAL DAILY
Qty: 30 TABLET | Refills: 0 | Status: SHIPPED | OUTPATIENT
Start: 2020-04-07 | End: 2020-05-05 | Stop reason: SDUPTHER

## 2020-04-07 RX ORDER — ROSUVASTATIN CALCIUM 20 MG/1
20 TABLET, COATED ORAL NIGHTLY
Qty: 30 TABLET | Refills: 0 | Status: SHIPPED | OUTPATIENT
Start: 2020-04-07 | End: 2020-05-05 | Stop reason: SDUPTHER

## 2020-04-07 RX ORDER — BUPROPION HYDROCHLORIDE 150 MG/1
150 TABLET ORAL EVERY MORNING
Qty: 30 TABLET | Refills: 0 | Status: SHIPPED | OUTPATIENT
Start: 2020-04-07 | End: 2020-05-05 | Stop reason: SDUPTHER

## 2020-04-08 ENCOUNTER — TELEPHONE (OUTPATIENT)
Dept: FAMILY MEDICINE CLINIC | Age: 65
End: 2020-04-08

## 2020-04-08 NOTE — TELEPHONE ENCOUNTER
If sx are mild and similar to her allergies, she should continue with her Flonase twice daily and an antihistamine like Claritin. If she develops a fever,cough, SOB or worsening sx then recommend flu clinic. She should self quarantine , however, for 2 weeks.

## 2020-04-27 ASSESSMENT — ENCOUNTER SYMPTOMS
CHEST TIGHTNESS: 0
BLOOD IN STOOL: 0
COUGH: 0
ABDOMINAL PAIN: 0
SHORTNESS OF BREATH: 0

## 2020-04-27 NOTE — PROGRESS NOTES
2020    TELEHEALTH EVALUATION -- Audio/Visual (During VQTBB-96 public health emergency)    HPI:    Julio Ramirez (:  1955) has requested an audio/video evaluation for the following concern(s):  Pt is here by VV for fu on hyperlipidemia, anxiety/insomnia, HTN, depression, RA, and prediabetes. She has been doing well on current tx mgt. Denies CP,SOB or myalgias, or arthralgias. Pt has a hx of RA but is not current;y followed by rheumatology because she has not had any sx. Last labs were over 6 months ago but good. Last HgA1c 5.7. denies sx of depression or anxiety. Sleeping well. No GI /GERD sx. Has not had updated mammogram or pap . Review of Systems   Constitutional: Negative for activity change, appetite change and unexpected weight change. Respiratory: Negative for cough, chest tightness and shortness of breath. Cardiovascular: Negative for chest pain, palpitations and leg swelling. Gastrointestinal: Negative for abdominal pain and blood in stool. Endocrine: Negative for cold intolerance and heat intolerance. Musculoskeletal: Negative for arthralgias and myalgias. Skin: Negative for rash. Neurological: Negative for light-headedness and headaches. Hematological: Negative for adenopathy. Does not bruise/bleed easily. Psychiatric/Behavioral: Negative for dysphoric mood, sleep disturbance and suicidal ideas. The patient is not nervous/anxious. Prior to Visit Medications    Medication Sig Taking?  Authorizing Provider   citalopram (CELEXA) 40 MG tablet Take 1 tablet by mouth daily Yes Valente Reid MD   losartan (COZAAR) 50 MG tablet Take 1 tablet by mouth daily Yes Valente Reid MD   gemfibrozil (LOPID) 600 MG tablet Take 1 tablet by mouth 2 times daily (before meals) Yes Valente Reid MD   rosuvastatin (CRESTOR) 20 MG tablet Take 1 tablet by mouth nightly Yes Valente Reid MD   traZODone (DESYREL) 100 MG tablet 1 po qhs Yes Valente Reid MD   Icosapent Ethyl (VASCEPA) 1 g CAPS capsule TAKE 2 CAPSULES BY MOUTH TWICE DAILY Yes Valente Reid MD   buPROPion (WELLBUTRIN XL) 150 MG extended release tablet Take 1 tablet by mouth every morning Yes Valente Reid MD   methylfolate (DEPLIN) 7.5 MG TABS tablet Take 7.5 tablets by mouth daily  Valente Reid MD   pantoprazole (PROTONIX) 20 MG tablet TAKE 1 TABLET BY MOUTH DAILY  Valente Reid MD   PROAIR  (90 BASE) MCG/ACT inhaler INHALE 2 PUFFS INTO THE LUNGS EVERY 6 HOURS AS NEEDED FOR WHEEZING  Valente Reid MD   Biotin 10 MG tablet Take 10 mg by mouth daily  Historical Provider, MD   Cholecalciferol (VITAMIN D) 2000 UNITS CAPS capsule Take by mouth  Historical Provider, MD   fluticasone (FLONASE) 50 MCG/ACT nasal spray 1 spray by Nasal route daily  [place] Reid MD   vitamin B-12 (CYANOCOBALAMIN) 100 MCG tablet Take 50 mcg by mouth daily  Historical Provider, MD   Pyridoxine HCl (VITAMIN B-6) 50 MG tablet Take 50 mg by mouth daily  Historical Provider, MD       Social History     Tobacco Use    Smoking status: Former Smoker     Packs/day: 0.25     Years: 5.00     Pack years: 1.25     Types: Cigarettes     Last attempt to quit: 2005     Years since quitting: 15.3    Smokeless tobacco: Never Used    Tobacco comment: 1-2 cigs/day   Substance Use Topics    Alcohol use:  Yes     Alcohol/week: 0.0 standard drinks     Comment: 1-2 drinks daily    Drug use: No        Past Medical History:   Diagnosis Date    Allergic     Anemia     Anxiety     Hyperlipidemia     Hypertension     RA (rheumatoid arthritis) ([facility] [address]     Steatohepatitis    ,   Past Surgical History:   Procedure Laterality Date     SECTION  1985     SECTION  1987    COLONOSCOPY  2006    With EGD    HYSTERECTOMY  2006    RHINOPLASTY      TUBAL LIGATION  1987    UPPER GASTROINTESTINAL ENDOSCOPY  2013    With colonoscopy   ,   Social History     Tobacco Use    Smoking status: Former Smoker     Packs/day: 0.25     Years: 5.00     Pack years: 1.25

## 2020-05-05 RX ORDER — ICOSAPENT ETHYL 1000 MG/1
CAPSULE ORAL
Qty: 120 CAPSULE | Refills: 0 | Status: SHIPPED | OUTPATIENT
Start: 2020-05-05 | End: 2020-07-07 | Stop reason: SDUPTHER

## 2020-05-05 RX ORDER — CITALOPRAM 40 MG/1
40 TABLET ORAL DAILY
Qty: 30 TABLET | Refills: 0 | Status: SHIPPED | OUTPATIENT
Start: 2020-05-05 | End: 2020-07-07 | Stop reason: SDUPTHER

## 2020-05-05 RX ORDER — LOSARTAN POTASSIUM 50 MG/1
50 TABLET ORAL DAILY
Qty: 30 TABLET | Refills: 0 | Status: SHIPPED | OUTPATIENT
Start: 2020-05-05 | End: 2020-07-07 | Stop reason: SDUPTHER

## 2020-05-05 RX ORDER — FLUTICASONE PROPIONATE 50 MCG
1 SPRAY, SUSPENSION (ML) NASAL DAILY
Qty: 1 BOTTLE | Refills: 2 | Status: SHIPPED | OUTPATIENT
Start: 2020-05-05 | End: 2020-07-07 | Stop reason: SDUPTHER

## 2020-05-05 RX ORDER — GEMFIBROZIL 600 MG/1
600 TABLET, FILM COATED ORAL
Qty: 60 TABLET | Refills: 0 | Status: SHIPPED | OUTPATIENT
Start: 2020-05-05 | End: 2020-07-07 | Stop reason: SDUPTHER

## 2020-05-05 RX ORDER — TRAZODONE HYDROCHLORIDE 100 MG/1
TABLET ORAL
Qty: 30 TABLET | Refills: 0 | Status: SHIPPED | OUTPATIENT
Start: 2020-05-05 | End: 2020-07-07 | Stop reason: SDUPTHER

## 2020-05-05 RX ORDER — ROSUVASTATIN CALCIUM 20 MG/1
20 TABLET, COATED ORAL NIGHTLY
Qty: 30 TABLET | Refills: 0 | Status: SHIPPED | OUTPATIENT
Start: 2020-05-05 | End: 2020-07-07 | Stop reason: SDUPTHER

## 2020-05-05 RX ORDER — BUPROPION HYDROCHLORIDE 150 MG/1
150 TABLET ORAL EVERY MORNING
Qty: 30 TABLET | Refills: 0 | Status: SHIPPED | OUTPATIENT
Start: 2020-05-05 | End: 2020-07-07 | Stop reason: SDUPTHER

## 2020-06-01 RX ORDER — LOSARTAN POTASSIUM 50 MG/1
50 TABLET ORAL DAILY
Qty: 30 TABLET | Refills: 0 | OUTPATIENT
Start: 2020-06-01

## 2020-07-07 ENCOUNTER — OFFICE VISIT (OUTPATIENT)
Dept: FAMILY MEDICINE CLINIC | Age: 65
End: 2020-07-07
Payer: COMMERCIAL

## 2020-07-07 VITALS
OXYGEN SATURATION: 96 % | TEMPERATURE: 98.2 F | WEIGHT: 121 LBS | DIASTOLIC BLOOD PRESSURE: 60 MMHG | HEART RATE: 96 BPM | SYSTOLIC BLOOD PRESSURE: 110 MMHG | BODY MASS INDEX: 22.86 KG/M2

## 2020-07-07 PROCEDURE — 1036F TOBACCO NON-USER: CPT | Performed by: FAMILY MEDICINE

## 2020-07-07 PROCEDURE — 3017F COLORECTAL CA SCREEN DOC REV: CPT | Performed by: FAMILY MEDICINE

## 2020-07-07 PROCEDURE — G8420 CALC BMI NORM PARAMETERS: HCPCS | Performed by: FAMILY MEDICINE

## 2020-07-07 PROCEDURE — 99214 OFFICE O/P EST MOD 30 MIN: CPT | Performed by: FAMILY MEDICINE

## 2020-07-07 PROCEDURE — G8427 DOCREV CUR MEDS BY ELIG CLIN: HCPCS | Performed by: FAMILY MEDICINE

## 2020-07-07 RX ORDER — ICOSAPENT ETHYL 1000 MG/1
CAPSULE ORAL
Qty: 120 CAPSULE | Refills: 2 | Status: SHIPPED | OUTPATIENT
Start: 2020-07-07 | End: 2020-09-29 | Stop reason: SDUPTHER

## 2020-07-07 RX ORDER — TRAZODONE HYDROCHLORIDE 100 MG/1
TABLET ORAL
Qty: 30 TABLET | Refills: 2 | Status: SHIPPED | OUTPATIENT
Start: 2020-07-07 | End: 2020-09-29 | Stop reason: SDUPTHER

## 2020-07-07 RX ORDER — CITALOPRAM 40 MG/1
40 TABLET ORAL DAILY
Qty: 30 TABLET | Refills: 2 | Status: SHIPPED | OUTPATIENT
Start: 2020-07-07 | End: 2020-09-29 | Stop reason: SDUPTHER

## 2020-07-07 RX ORDER — LOSARTAN POTASSIUM 50 MG/1
50 TABLET ORAL DAILY
Qty: 30 TABLET | Refills: 2 | Status: SHIPPED | OUTPATIENT
Start: 2020-07-07 | End: 2020-09-29 | Stop reason: SDUPTHER

## 2020-07-07 RX ORDER — ROSUVASTATIN CALCIUM 20 MG/1
20 TABLET, COATED ORAL NIGHTLY
Qty: 30 TABLET | Refills: 2 | Status: SHIPPED | OUTPATIENT
Start: 2020-07-07 | End: 2020-09-29 | Stop reason: SDUPTHER

## 2020-07-07 RX ORDER — BUPROPION HYDROCHLORIDE 150 MG/1
150 TABLET ORAL EVERY MORNING
Qty: 30 TABLET | Refills: 2 | Status: SHIPPED | OUTPATIENT
Start: 2020-07-07 | End: 2020-09-29 | Stop reason: SDUPTHER

## 2020-07-07 RX ORDER — FAMOTIDINE 20 MG/1
20 TABLET, FILM COATED ORAL 2 TIMES DAILY
Qty: 60 TABLET | Refills: 0 | Status: SHIPPED | OUTPATIENT
Start: 2020-07-07 | End: 2020-07-31 | Stop reason: SDUPTHER

## 2020-07-07 RX ORDER — GEMFIBROZIL 600 MG/1
600 TABLET, FILM COATED ORAL
Qty: 60 TABLET | Refills: 0 | Status: SHIPPED | OUTPATIENT
Start: 2020-07-07 | End: 2020-09-29 | Stop reason: SDUPTHER

## 2020-07-07 RX ORDER — FLUTICASONE PROPIONATE 50 MCG
1 SPRAY, SUSPENSION (ML) NASAL DAILY
Qty: 1 BOTTLE | Refills: 2 | Status: SHIPPED | OUTPATIENT
Start: 2020-07-07 | End: 2020-09-29 | Stop reason: SDUPTHER

## 2020-07-07 ASSESSMENT — ENCOUNTER SYMPTOMS
COUGH: 0
BLOOD IN STOOL: 0
ABDOMINAL PAIN: 0
SHORTNESS OF BREATH: 0
DIARRHEA: 0
CONSTIPATION: 0
NAUSEA: 1
CHEST TIGHTNESS: 0

## 2020-07-07 NOTE — PROGRESS NOTES
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 ([facility] [address] 11/02/2015    Pure hypercholesterolemia 11/02/2015    Hypertriglyceridemia, familial 11/02/2015    Concussion with brief (less than one hour) loss of consciousness 09/01/2015    Concussion with < 1 hr loss of consciousness 09/01/2015    Fracture of multiple ribs 08/27/2015    Gastroduodenitis 07/01/2015    Heterozygous MTHFR mutation [id number] ([facility] [address] 12/16/2014    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[facility] [address] 12/16/2014    Vitamin D deficiency 12/16/2014     Overview:   Replaced inactive diagnosis term via diagnosis import      Allergic rhinitis 04/28/2014    Menopause 04/28/2014    BP (high blood pressure) 12/15/2013    Steatohepatitis     RA (rheumatoid arthritis) ([facility] [address]     Anxiety     Hyperlipidemia     Synovial cyst 05/14/2013    Arthritis of hand, degenerative 02/14/2013    Alopecia 11/27/2012    Loss of hair 11/27/2012    Dry mouth 11/13/2012    Rash 11/13/2012    Dry eyes 11/13/2012    Joint swelling 11/13/2012    Elevated rheumatoid factor 11/13/2012       HPI   Pt is here today for fu on hyperlipidemia, HTN, prediabetes, anxiety/depression, chronic insomnia, RA, and co nausea in the morning. She has been stable on current meds. Has been trying to adhere to a better diet. Denies CP,SOB or myalgias. No ha,change in vision or neurologic sx. Occasional loose stools. Denies sx of depression or anxiety. Sleeping well. Has a hx of prediabetes. Last HgA1c 5.7. she has noticed some nausea when wakes up in the morning. Sometimes vomiting if drinking water prior to eating. Also occasional heartburn in the evening or when laying down. Denies any new medications or tx. No tx at home. She has not been on any tx for RA and reports she has not had any sx. Past Medical History:   Diagnosis Date    Allergic     Anemia     Anxiety     Hyperlipidemia     Hypertension     RA (rheumatoid arthritis) ([facility] [address]     Steatohepatitis      Social History     Socioeconomic History    Marital status:      Spouse name: Not on file    Number of children: Not on file    Years of education: Not on file    Highest education level: Not on file   Occupational History    Not on file   Social Needs    Financial resource strain: Not on file    Food insecurity     Worry: Not on file     Inability: Not on file    Transportation needs     Medical: Not on file     Non-medical: Not on file   Tobacco Use    Smoking status: Former Smoker     Packs/day: 0.25     Years: 5.00     Pack years: 1.25     Types: Cigarettes     Last attempt to quit: 2005     Years since quitting: 15.5    Smokeless tobacco: Never Used    Tobacco comment: 1-2 cigs/day   Substance and Sexual Activity    Alcohol use:  Yes     Alcohol/week: 0.0 standard drinks     Comment: 1-2 drinks daily    Drug use: No    Sexual activity: Not on file   Lifestyle    Physical activity     Days per week: Not on file     Minutes per session: Not on file    Stress: Not on file   Relationships    Social connections     Talks on phone: Not on file     Gets together: Not on file     Attends Jainism service: Not on file     Active member of club or organization: Not on file     Attends meetings of clubs or organizations: Not on file     Relationship status: Not on file    Intimate partner violence     Fear of current or ex partner: Not on file     Emotionally abused: Not on file Physically abused: Not on file     Forced sexual activity: Not on file   Other Topics Concern    Not on file   Social History Narrative    Not on file     Family History   Problem Relation Age of Onset    Diabetes Sister     Cancer Mother         breast cancer       Review of Systems   Constitutional: Negative for activity change, appetite change and unexpected weight change. Respiratory: Negative for cough, chest tightness and shortness of breath. Cardiovascular: Negative for chest pain, palpitations and leg swelling. Gastrointestinal: Positive for nausea. Negative for abdominal pain, blood in stool, constipation and diarrhea. Musculoskeletal: Negative for arthralgias and myalgias. Skin: Negative for rash. Neurological: Negative for light-headedness and headaches. Hematological: Negative for adenopathy. Does not bruise/bleed easily. Psychiatric/Behavioral: Negative for dysphoric mood, sleep disturbance and suicidal ideas. The patient is not nervous/anxious. OBJECTIVE:  /60   Pulse 96   Temp 98.2 °F (36.8 °C)   Wt 121 lb (54.9 kg)   SpO2 96%   BMI 22.86 kg/m²   Physical Exam  Vitals signs and nursing note reviewed. Constitutional:       Appearance: She is well-developed. Eyes:      Pupils: Pupils are equal, round, and reactive to light. Neck:      Musculoskeletal: Normal range of motion and neck supple. Thyroid: No thyromegaly. Vascular: No JVD. Cardiovascular:      Rate and Rhythm: Normal rate and regular rhythm. Pulmonary:      Effort: Pulmonary effort is normal.      Breath sounds: Normal breath sounds. Abdominal:      General: Bowel sounds are normal. There is no distension. Palpations: Abdomen is soft. Tenderness: There is no abdominal tenderness. There is no guarding. Skin:     Findings: No rash. Neurological:      Mental Status: She is alert and oriented to person, place, and time.    Psychiatric:         Behavior: Behavior normal. Thought Content: Thought content normal.         ASSESSMENT/PLAN:    1. Hyperlipidemia, unspecified hyperlipidemia type  Refill meds  Diet mgt reviewed  labs    2. Essential hypertension  Continue current mgt  Refill meds  Intermittent ambulatory BP checks    3. Prediabetes  Labs  Diet mgt. Will try to monitor    4. Anxiety  Continue Celexa  Stress mgt    5. Insomnia, unspecified type  appr sleep hygiene  Refill trazodone    6. 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 No sx. 7. Depression, unspecified depression type  Stress mgt  Celexa    8. Gastroesophageal reflux disease, esophagitis presence not specified  GERD precautions  Trial of Pepcid  3-4 week fu or earlier prn      No orders of the defined types were placed in this encounter.     Current Outpatient Medications   Medication Sig Dispense Refill    losartan (COZAAR) 50 MG tablet Take 1 tablet by mouth daily 30 tablet 2    Icosapent Ethyl (VASCEPA) 1 g CAPS capsule TAKE 2 CAPSULES BY MOUTH TWICE DAILY 120 capsule 2    gemfibrozil (LOPID) 600 MG tablet Take 1 tablet by mouth 2 times daily (before meals) 60 tablet 0    fluticasone (FLONASE) 50 MCG/ACT nasal spray 1 spray by Nasal route daily 1 Bottle 2    citalopram (CELEXA) 40 MG tablet Take 1 tablet by mouth daily 30 tablet 2    buPROPion (WELLBUTRIN XL) 150 MG extended release tablet Take 1 tablet by mouth every morning 30 tablet 2    rosuvastatin (CRESTOR) 20 MG tablet Take 1 tablet by mouth nightly 30 tablet 2    traZODone (DESYREL) 100 MG tablet 1 po qhs 30 tablet 2    famotidine (PEPCID) 20 MG tablet Take 1 tablet by mouth 2 times daily 60 tablet 0    methylfolate (DEPLIN) 7.5 MG TABS tablet Take 7.5 tablets by mouth daily 30 tablet 1    pantoprazole (PROTONIX) 20 MG tablet TAKE 1 TABLET BY MOUTH DAILY 30 tablet 2    PROAIR  (90 BASE) MCG/ACT inhaler INHALE 2 PUFFS INTO THE LUNGS EVERY 6 HOURS AS NEEDED FOR WHEEZING 8.5 g 0    Biotin 10 MG tablet Take 10 mg by mouth daily      Cholecalciferol (VITAMIN D) 2000 UNITS CAPS capsule Take by mouth      vitamin B-12 (CYANOCOBALAMIN) 100 MCG tablet Take 50 mcg by mouth daily      Pyridoxine HCl (VITAMIN B-6) 50 MG tablet Take 50 mg by mouth daily       No current facility-administered medications for this visit. mammogram advised and has been ordered   Shingrix advised  Return in about 3 weeks (around 7/28/2020), or if symptoms worsen or fail to improve, for GERD.     Kelly Lang MD

## 2020-07-31 ENCOUNTER — OFFICE VISIT (OUTPATIENT)
Dept: FAMILY MEDICINE CLINIC | Age: 65
End: 2020-07-31
Payer: COMMERCIAL

## 2020-07-31 VITALS
TEMPERATURE: 98 F | HEART RATE: 112 BPM | HEIGHT: 61 IN | BODY MASS INDEX: 22.96 KG/M2 | DIASTOLIC BLOOD PRESSURE: 74 MMHG | SYSTOLIC BLOOD PRESSURE: 120 MMHG | OXYGEN SATURATION: 98 % | WEIGHT: 121.6 LBS

## 2020-07-31 LAB
A/G RATIO: 1.3 (ref 1.1–2.2)
ALBUMIN SERPL-MCNC: 4.5 G/DL (ref 3.4–5)
ALP BLD-CCNC: 143 U/L (ref 40–129)
ALT SERPL-CCNC: 13 U/L (ref 10–40)
ANION GAP SERPL CALCULATED.3IONS-SCNC: 20 MMOL/L (ref 3–16)
AST SERPL-CCNC: 31 U/L (ref 15–37)
BASOPHILS ABSOLUTE: 0.1 K/UL (ref 0–0.2)
BASOPHILS RELATIVE PERCENT: 0.8 %
BILIRUB SERPL-MCNC: <0.2 MG/DL (ref 0–1)
BUN BLDV-MCNC: 24 MG/DL (ref 7–20)
CALCIUM SERPL-MCNC: 10.4 MG/DL (ref 8.3–10.6)
CHLORIDE BLD-SCNC: 99 MMOL/L (ref 99–110)
CHOLESTEROL, TOTAL: 179 MG/DL (ref 0–199)
CO2: 20 MMOL/L (ref 21–32)
CREAT SERPL-MCNC: 2.3 MG/DL (ref 0.6–1.2)
EOSINOPHILS ABSOLUTE: 0 K/UL (ref 0–0.6)
EOSINOPHILS RELATIVE PERCENT: 0.6 %
GFR AFRICAN AMERICAN: 26
GFR NON-AFRICAN AMERICAN: 21
GLOBULIN: 3.5 G/DL
GLUCOSE BLD-MCNC: 107 MG/DL (ref 70–99)
HCT VFR BLD CALC: 31 % (ref 36–48)
HDLC SERPL-MCNC: 117 MG/DL (ref 40–60)
HEMOGLOBIN: 10.4 G/DL (ref 12–16)
LDL CHOLESTEROL CALCULATED: 44 MG/DL
LYMPHOCYTES ABSOLUTE: 1 K/UL (ref 1–5.1)
LYMPHOCYTES RELATIVE PERCENT: 15.9 %
MCH RBC QN AUTO: 35.5 PG (ref 26–34)
MCHC RBC AUTO-ENTMCNC: 33.6 G/DL (ref 31–36)
MCV RBC AUTO: 105.8 FL (ref 80–100)
MONOCYTES ABSOLUTE: 0.6 K/UL (ref 0–1.3)
MONOCYTES RELATIVE PERCENT: 9.5 %
NEUTROPHILS ABSOLUTE: 4.6 K/UL (ref 1.7–7.7)
NEUTROPHILS RELATIVE PERCENT: 73.2 %
PDW BLD-RTO: 13.4 % (ref 12.4–15.4)
PLATELET # BLD: 274 K/UL (ref 135–450)
PMV BLD AUTO: 9.4 FL (ref 5–10.5)
POTASSIUM SERPL-SCNC: 5.6 MMOL/L (ref 3.5–5.1)
RBC # BLD: 2.93 M/UL (ref 4–5.2)
SODIUM BLD-SCNC: 139 MMOL/L (ref 136–145)
TOTAL CK: 30 U/L (ref 26–192)
TOTAL PROTEIN: 8 G/DL (ref 6.4–8.2)
TRIGL SERPL-MCNC: 88 MG/DL (ref 0–150)
VLDLC SERPL CALC-MCNC: 18 MG/DL
WBC # BLD: 6.3 K/UL (ref 4–11)

## 2020-07-31 PROCEDURE — G8420 CALC BMI NORM PARAMETERS: HCPCS | Performed by: FAMILY MEDICINE

## 2020-07-31 PROCEDURE — 99214 OFFICE O/P EST MOD 30 MIN: CPT | Performed by: FAMILY MEDICINE

## 2020-07-31 PROCEDURE — 1036F TOBACCO NON-USER: CPT | Performed by: FAMILY MEDICINE

## 2020-07-31 PROCEDURE — G8427 DOCREV CUR MEDS BY ELIG CLIN: HCPCS | Performed by: FAMILY MEDICINE

## 2020-07-31 PROCEDURE — 3017F COLORECTAL CA SCREEN DOC REV: CPT | Performed by: FAMILY MEDICINE

## 2020-07-31 RX ORDER — ERYTHROMYCIN 5 MG/G
OINTMENT OPHTHALMIC
Qty: 1 TUBE | Refills: 0 | Status: SHIPPED | OUTPATIENT
Start: 2020-07-31 | End: 2020-08-10

## 2020-07-31 RX ORDER — FAMOTIDINE 20 MG/1
20 TABLET, FILM COATED ORAL 2 TIMES DAILY
Qty: 60 TABLET | Refills: 1 | Status: SHIPPED | OUTPATIENT
Start: 2020-07-31 | End: 2021-01-18 | Stop reason: SDUPTHER

## 2020-08-02 ASSESSMENT — ENCOUNTER SYMPTOMS
COUGH: 0
SHORTNESS OF BREATH: 0
CHEST TIGHTNESS: 0
CONSTIPATION: 0
DIARRHEA: 0
ABDOMINAL PAIN: 0

## 2020-08-03 NOTE — PROGRESS NOTES
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 ([facility] [address] 11/02/2015    Pure hypercholesterolemia 11/02/2015    Hypertriglyceridemia, familial 11/02/2015    Concussion with brief (less than one hour) loss of consciousness 09/01/2015    Concussion with < 1 hr loss of consciousness 09/01/2015    Fracture of multiple ribs 08/27/2015    Gastroduodenitis 07/01/2015    Heterozygous MTHFR mutation [id number] ([facility] [address] 12/16/2014    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[facility] [address] 12/16/2014    Vitamin D deficiency 12/16/2014     Overview:   Replaced inactive diagnosis term via diagnosis import      Allergic rhinitis 04/28/2014    Menopause 04/28/2014    BP (high blood pressure) 12/15/2013    Steatohepatitis     RA (rheumatoid arthritis) ([facility] [address]     Anxiety     Hyperlipidemia     Synovial cyst 05/14/2013    Arthritis of hand, degenerative 02/14/2013    Alopecia 11/27/2012    Loss of hair 11/27/2012    Dry mouth 11/13/2012    Rash 11/13/2012    Dry eyes 11/13/2012    Joint swelling 11/13/2012    Elevated rheumatoid factor 11/13/2012       HPI   Pt with hx of prediabetes and hyperlipidemia is here today for fu on GERD and co a swelling in R groin and a sore on Left lower eyelid. She has been on Pepcid and reports GI/GERD sx has resolved completely. She has also been trying to adhere to a better diet. Good PO. No weight changes.  she has also noticed a few d of a sore on L lower eyelid. Area is irritated and slightly itchy. No hx of injuries. No change in vision. She has also noticed a swelling in R groin in a couple of months. No injuries. No pain. Past Medical History:   Diagnosis Date    Allergic     Anemia     Anxiety     Hyperlipidemia     Hypertension     RA (rheumatoid arthritis) ([facility] [address]     Steatohepatitis      Social History     Socioeconomic History    Marital status:      Spouse name: Not on file    Number of children: Not on file    Years of education: Not on file    Highest education level: Not on file   Occupational History    Not on file   Social Needs    Financial resource strain: Not on file    Food insecurity     Worry: Not on file     Inability: Not on file    Transportation needs     Medical: Not on file     Non-medical: Not on file   Tobacco Use    Smoking status: Former Smoker     Packs/day: 0.25     Years: 5.00     Pack years: 1.25     Types: Cigarettes     Last attempt to quit: 2005     Years since quitting: 15.5    Smokeless tobacco: Never Used    Tobacco comment: 1-2 cigs/day   Substance and Sexual Activity    Alcohol use:  Yes     Alcohol/week: 0.0 standard drinks     Comment: 1-2 drinks daily    Drug use: No    Sexual activity: Not on file   Lifestyle    Physical activity     Days per week: Not on file     Minutes per session: Not on file    Stress: Not on file   Relationships    Social connections     Talks on phone: Not on file     Gets together: Not on file     Attends Taoism service: Not on file     Active member of club or organization: Not on file     Attends meetings of clubs or organizations: Not on file     Relationship status: Not on file    Intimate partner violence     Fear of current or ex partner: Not on file     Emotionally abused: Not on file     Physically abused: Not on file     Forced sexual activity: Not on file   Other Topics Concern    Not on file   Social History Narrative    Not on file     Family 2    methylfolate (DEPLIN) 7.5 MG TABS tablet Take 7.5 tablets by mouth daily 30 tablet 1    pantoprazole (PROTONIX) 20 MG tablet TAKE 1 TABLET BY MOUTH DAILY 30 tablet 2    PROAIR  (90 BASE) MCG/ACT inhaler INHALE 2 PUFFS INTO THE LUNGS EVERY 6 HOURS AS NEEDED FOR WHEEZING 8.5 g 0    Biotin 10 MG tablet Take 10 mg by mouth daily      Cholecalciferol (VITAMIN D) 2000 UNITS CAPS capsule Take by mouth      vitamin B-12 (CYANOCOBALAMIN) 100 MCG tablet Take 50 mcg by mouth daily      Pyridoxine HCl (VITAMIN B-6) 50 MG tablet Take 50 mg by mouth daily       No current facility-administered medications for this visit. mammogram advised  Return in about 2 months (around 9/30/2020), or if symptoms worsen or fail to improve, for GERD, depression, hyperlipidemia.     Humaira Madrigal MD

## 2020-08-24 ENCOUNTER — OFFICE VISIT (OUTPATIENT)
Dept: SURGERY | Age: 65
End: 2020-08-24
Payer: COMMERCIAL

## 2020-08-24 VITALS
DIASTOLIC BLOOD PRESSURE: 68 MMHG | TEMPERATURE: 97.4 F | SYSTOLIC BLOOD PRESSURE: 116 MMHG | BODY MASS INDEX: 23.85 KG/M2 | WEIGHT: 126.2 LBS

## 2020-08-24 PROCEDURE — G8427 DOCREV CUR MEDS BY ELIG CLIN: HCPCS | Performed by: SURGERY

## 2020-08-24 PROCEDURE — G8420 CALC BMI NORM PARAMETERS: HCPCS | Performed by: SURGERY

## 2020-08-24 PROCEDURE — 99203 OFFICE O/P NEW LOW 30 MIN: CPT | Performed by: SURGERY

## 2020-08-24 ASSESSMENT — ENCOUNTER SYMPTOMS
RESPIRATORY NEGATIVE: 1
ALLERGIC/IMMUNOLOGIC NEGATIVE: 1
ABDOMINAL DISTENTION: 1
EYES NEGATIVE: 1

## 2020-08-24 NOTE — LETTER
[address] [address]  Phone: [phone]  Fax: [phone]    August 24, 2020    Patient: Mike Degroot  MRN:  [id number]  YOB: 1955  Date of Visit: 8/24/2020    Dear Dr Segundo Phipps: Thank you for the request for consultation for [facility]. Below are the relevant portions of my assessment and plan of care. Assessment:  30-year-old female who presents for evaluation of a bulge in the right groin region which has been present for about 18 months. The bulge does not cause significant discomfort but has increased in size over time. She denies associated abdominal symptoms. Physical examination reveals a moderate sized reducible right inguinal hernia. There is no evidence of a left inguinal hernia. Plan:  Robotic laparoscopic right inguinal hernia repair with mesh. If you have questions, please do not hesitate to call me. I look forward to following Amber Carranza along with you.     Sincerely,    Helen Fischer MD    CC providers:    Juliano Gardiner MD  [address] [facility]

## 2020-08-24 NOTE — PROGRESS NOTES
Relationship status: Not on file    Intimate partner violence     Fear of current or ex partner: Not on file     Emotionally abused: Not on file     Physically abused: Not on file     Forced sexual activity: Not on file   Other Topics Concern    Not on file   Social History Narrative    Not on file       Review of Systems   Constitutional: Negative. HENT: Negative. Eyes: Negative. Respiratory: Negative. Cardiovascular: Negative. Gastrointestinal: Positive for abdominal distention. Endocrine: Negative. Genitourinary: Negative. Musculoskeletal: Negative. Skin: Negative. Allergic/Immunologic: Negative. Neurological: Negative. Hematological: Negative. Psychiatric/Behavioral: Negative.        :   Physical Exam  Constitutional:       Appearance: She is well-developed. HENT:      Head: Normocephalic and atraumatic. Right Ear: External ear normal.      Left Ear: External ear normal.   Eyes:      Conjunctiva/sclera: Conjunctivae normal.   Neck:      Musculoskeletal: Normal range of motion and neck supple. Cardiovascular:      Rate and Rhythm: Normal rate and regular rhythm. Pulmonary:      Effort: Pulmonary effort is normal.      Breath sounds: Normal breath sounds. Abdominal:      General: There is no distension. Palpations: Abdomen is soft. Comments: Moderate size reducible right inguinal hernia. No evidence of a left inguinal hernia   Musculoskeletal: Normal range of motion. Skin:     General: Skin is warm and dry. Neurological:      Mental Status: She is alert and oriented to person, place, and time. Psychiatric:         Behavior: Behavior normal.       Temp 97.4 °F (36.3 °C)   Wt 126 lb 3.2 oz (57.2 kg)   BMI 23.85 kg/m²     :      61-year-old female who presents for evaluation of a bulge in the right groin region which has been present for about 18 months. The bulge does not cause significant discomfort but has increased in size over time.  She denies associated abdominal symptoms. Physical examination reveals a moderate sized reducible right inguinal hernia. There is no evidence of a left inguinal hernia. Plan:      Robotic laparoscopic right inguinal hernia repair with mesh. Patient explained risks, benefits and complications of hernia repair including hernia recurrence, infection requiring mesh removal, bowel injury or erosion of mesh into bowel and nerve entrapment.

## 2020-09-08 RX ORDER — ONDANSETRON 4 MG/1
4 TABLET, FILM COATED ORAL EVERY 12 HOURS PRN
Qty: 8 TABLET | Refills: 0 | Status: SHIPPED | OUTPATIENT
Start: 2020-09-08 | End: 2021-01-18 | Stop reason: SDUPTHER

## 2020-09-08 NOTE — TELEPHONE ENCOUNTER
I sent in  Medication for nausea to her pharmacy. She should be evaluated if sx persist, SOB, sx of dehydration .

## 2020-09-08 NOTE — TELEPHONE ENCOUNTER
Pt called in stating she has been Vomiting everyday since Wednesday Sept 2nd.  Pt states she was given a medication to h elp control the vomiting and diarrhea but cant recall the name, would like to have a refill so she can have surgery for her hernia on the 15th of September

## 2020-09-08 NOTE — TELEPHONE ENCOUNTER
Pt states she does not have a fever or uti sx, states she is drinking water and eating what she can but is mostly drinking water.

## 2020-09-10 ENCOUNTER — OFFICE VISIT (OUTPATIENT)
Dept: PRIMARY CARE CLINIC | Age: 65
End: 2020-09-10
Payer: COMMERCIAL

## 2020-09-10 PROCEDURE — G8420 CALC BMI NORM PARAMETERS: HCPCS | Performed by: NURSE PRACTITIONER

## 2020-09-10 PROCEDURE — 99211 OFF/OP EST MAY X REQ PHY/QHP: CPT | Performed by: NURSE PRACTITIONER

## 2020-09-10 PROCEDURE — G8428 CUR MEDS NOT DOCUMENT: HCPCS | Performed by: NURSE PRACTITIONER

## 2020-09-10 NOTE — PROGRESS NOTES
take you home after your surgery. You will not be allowed to leave alone or drive yourself home. It is strongly suggested someone stay with you the first 24 hrs. Your surgery will be cancelled if you do not have a ride home. 8. A parent/legal guardian must accompany a child scheduled for surgery and plan to stay at the hospital until the child is discharged. Please do not bring other children with you. 9. Please wear simple, loose fitting clothing to the hospital.  Tomma Gammons not bring valuables (money, credit cards, checkbooks, etc.) Do not wear any makeup (including no eye makeup) or nail polish on your fingers or toes. 10. DO NOT wear any jewelry or piercings on day of surgery. All body piercing jewelry must be removed. 11. If you have ___dentures, they will be removed before going to the OR; we will provide you a container. If you wear ___contact lenses or ___glasses, they will be removed; please bring a case for them. 12. Please see your family doctor/pediatrician for a history & physical and/or concerning medications. Bring any test results/reports from your physician's office. PCP__________________Phone___________H&P Appt. Date________             13 If you  have a Living Will and Durable Power of  for Healthcare, please bring in a copy. 15. Notify your Surgeon if you develop any illness between now and surgery  time, cough, cold, fever, sore throat, nausea, vomiting, etc.  Please notify your surgeon if you experience dizziness, shortness of breath or blurred vision between now & the time of your surgery             15. DO NOT shave your operative site 96 hours prior to surgery. For face & neck surgery, men may use an electric razor 48 hours prior to surgery. 16. Shower the night before or morning of surgery using an antibacterial soap or as you have been instructed.              17. To provide excellent care visitors will be limited to one in the room at any given time. 18.  Please bring picture ID and insurance card. 19.  Visit our web site for additional information:  [email or web address]              20.During flu season no children under the age of 15 are permitted in the hospital for the safety of all patients. 21. If you take a long acting insulin in the evening only  take half of your usual  dose the night  before your procedure              22. If you use a c-pap please bring DOS if staying overnight,             23.For your convenience [facility] has a pharmacy on site to fill your prescriptions. 24. If you use oxygen and have a portable tank please bring it  with you the DOS             25. Bring a complete list of all your medications with name and dose include any supplements. 26. Other__________________________________________   *Please call pre admission testing if you any further questions   [address]    [place] [phone] [facility]  [phone]   [address] [place] [address]       All above information reviewed with patient in person or by phone. Patient verbalizes understanding. All questions and concerns addressed. Patient/Rep___PATIENT_________________                                                                                                                             There is a one visitor policy at [facility] for all surgeries and endoscopies. Whether the visitor can stay or will be asked to wait in the car will depend on the current policy and if social distancing can be maintained. The policy is subject to change at any time. Please make sure the visitor has a cell phone that is on,charged and able to accept calls, as this may be the way that the staff communicates with them. Pain management is NO VISITOR policyThe patients ride is expected to remain in the car with a cell phone for communication. If the ride is leaving the hospital grounds please make sure they are back in time for pickup. Have the patient inform the staff on arrival what their rides plans are while the patient is in the facility. At the MAIN there is one visitor allowed. Please note that the visitor policy is subject to change.        PRE OP INSTRUCTIONS

## 2020-09-10 NOTE — PROGRESS NOTES
Panchito Jones received a viral test for COVID-19. They were educated on isolation and quarantine as appropriate. For any symptoms, they were directed to seek care from their PCP, given contact information to establish with a doctor, directed to an urgent care or the emergency room.

## 2020-09-10 NOTE — PATIENT INSTRUCTIONS
You have received a viral test for COVID-19. Below is education on quarantine per the CDC guidelines. For any symptoms, seek care from your PCP, call [phone] to establish care with a doctor, or go directly to an urgent care or the emergency room. Test results will take 2-7 days and will be sent to you in your [employer] account. If you test positive, you will be contacted via phone. If you test negative, the ONLY communication will be through [address] GO TO [employer] ([employer]) AND SIGN UP FOR [employer]  (LOWER LEFT OF THE HOME PAGE)  No test is 100%. If you have symptoms, you should follow the guidance of quarantine as previously stated. You can still be contagious if you have symptoms. Your [facility] will reach out to you if you have a positive result. They will provide you with a return to work date and note. If you were tested for a pre-op, then you should remain in quarantine until your procedure. How do I know if I need to be in quarantine? If you live in a community where COVID-19 is or might be spreading (currently, that is virtually everywhere in the [place])  Be alert for symptoms. Watch for fever, cough, shortness of breath, or other symptoms of COVID-19.  Take your temperature if symptoms develop.  Practice social distancing. Maintain 6 feet of distance from others and stay out of crowded places.  Follow CDC guidance if symptoms develop. If you feel healthy but:   Recently had close contact with a person with COVID-19 you need to Quarantine:   Stay home until 14 days after your last exposure.  Check your temperature twice a day and watch for symptoms of COVID-19.  If possible, stay away from people who are at higher-risk for getting very sick from COVID-19.   Stay Home and Monitor Your Health if you:   Have been diagnosed with COVID-19, or   Are waiting for test results, or   Have cough, fever, or shortness of breath, or symptoms of COVID-19      When You Can

## 2020-09-12 LAB — SARS-COV-2, NAA: NOT DETECTED

## 2020-09-15 ENCOUNTER — ANESTHESIA (OUTPATIENT)
Dept: OPERATING ROOM | Age: 65
End: 2020-09-15
Payer: COMMERCIAL

## 2020-09-15 ENCOUNTER — ANESTHESIA EVENT (OUTPATIENT)
Dept: OPERATING ROOM | Age: 65
End: 2020-09-15
Payer: COMMERCIAL

## 2020-09-15 ENCOUNTER — HOSPITAL ENCOUNTER (OUTPATIENT)
Age: 65
Setting detail: OUTPATIENT SURGERY
Discharge: HOME | End: 2020-09-15
Attending: SURGERY | Admitting: SURGERY
Payer: COMMERCIAL

## 2020-09-15 VITALS
HEART RATE: 82 BPM | TEMPERATURE: 97.8 F | WEIGHT: 120.44 LBS | BODY MASS INDEX: 22.74 KG/M2 | HEIGHT: 61 IN | DIASTOLIC BLOOD PRESSURE: 47 MMHG | SYSTOLIC BLOOD PRESSURE: 108 MMHG | OXYGEN SATURATION: 92 % | RESPIRATION RATE: 13 BRPM

## 2020-09-15 VITALS
RESPIRATION RATE: 17 BRPM | DIASTOLIC BLOOD PRESSURE: 86 MMHG | TEMPERATURE: 96.6 F | SYSTOLIC BLOOD PRESSURE: 129 MMHG | OXYGEN SATURATION: 100 %

## 2020-09-15 PROCEDURE — 2500000003 HC RX 250 WO HCPCS: Performed by: NURSE ANESTHETIST, CERTIFIED REGISTERED

## 2020-09-15 PROCEDURE — 49650 LAP ING HERNIA REPAIR INIT: CPT | Performed by: SURGERY

## 2020-09-15 PROCEDURE — 2500000003 HC RX 250 WO HCPCS: Performed by: SURGERY

## 2020-09-15 PROCEDURE — 6360000002 HC RX W HCPCS: Performed by: SURGERY

## 2020-09-15 PROCEDURE — 6360000002 HC RX W HCPCS: Performed by: NURSE ANESTHETIST, CERTIFIED REGISTERED

## 2020-09-15 PROCEDURE — 2720000010 HC SURG SUPPLY STERILE: Performed by: SURGERY

## 2020-09-15 PROCEDURE — 3600000019 HC SURGERY ROBOT ADDTL 15MIN: Performed by: SURGERY

## 2020-09-15 PROCEDURE — 2709999900 HC NON-CHARGEABLE SUPPLY: Performed by: SURGERY

## 2020-09-15 PROCEDURE — 7100000001 HC PACU RECOVERY - ADDTL 15 MIN: Performed by: SURGERY

## 2020-09-15 PROCEDURE — 7100000011 HC PHASE II RECOVERY - ADDTL 15 MIN: Performed by: SURGERY

## 2020-09-15 PROCEDURE — C1781 MESH (IMPLANTABLE): HCPCS | Performed by: SURGERY

## 2020-09-15 PROCEDURE — 3700000001 HC ADD 15 MINUTES (ANESTHESIA): Performed by: SURGERY

## 2020-09-15 PROCEDURE — 2580000003 HC RX 258: Performed by: SURGERY

## 2020-09-15 PROCEDURE — 7100000010 HC PHASE II RECOVERY - FIRST 15 MIN: Performed by: SURGERY

## 2020-09-15 PROCEDURE — 7100000000 HC PACU RECOVERY - FIRST 15 MIN: Performed by: SURGERY

## 2020-09-15 PROCEDURE — 2580000003 HC RX 258: Performed by: NURSE ANESTHETIST, CERTIFIED REGISTERED

## 2020-09-15 PROCEDURE — 3600000009 HC SURGERY ROBOT BASE: Performed by: SURGERY

## 2020-09-15 PROCEDURE — S2900 ROBOTIC SURGICAL SYSTEM: HCPCS | Performed by: SURGERY

## 2020-09-15 PROCEDURE — 3700000000 HC ANESTHESIA ATTENDED CARE: Performed by: SURGERY

## 2020-09-15 PROCEDURE — 6370000000 HC RX 637 (ALT 250 FOR IP): Performed by: ANESTHESIOLOGY

## 2020-09-15 DEVICE — MESH HERN L W10.8XL16CM R INGUINAL WHT POLYPR MFIL: Type: IMPLANTABLE DEVICE | Status: FUNCTIONAL

## 2020-09-15 RX ORDER — SODIUM CHLORIDE 9 MG/ML
INJECTION, SOLUTION INTRAVENOUS CONTINUOUS
Status: DISCONTINUED | OUTPATIENT
Start: 2020-09-15 | End: 2020-09-15 | Stop reason: HOSPADM

## 2020-09-15 RX ORDER — LIDOCAINE HYDROCHLORIDE 20 MG/ML
INJECTION, SOLUTION EPIDURAL; INFILTRATION; INTRACAUDAL; PERINEURAL PRN
Status: DISCONTINUED | OUTPATIENT
Start: 2020-09-15 | End: 2020-09-15 | Stop reason: SDUPTHER

## 2020-09-15 RX ORDER — HYDROCODONE BITARTRATE AND ACETAMINOPHEN 5; 325 MG/1; MG/1
1-2 TABLET ORAL EVERY 4 HOURS PRN
Qty: 15 TABLET | Refills: 0 | Status: SHIPPED | OUTPATIENT
Start: 2020-09-15 | End: 2020-09-18

## 2020-09-15 RX ORDER — FENTANYL CITRATE 50 UG/ML
INJECTION, SOLUTION INTRAMUSCULAR; INTRAVENOUS PRN
Status: DISCONTINUED | OUTPATIENT
Start: 2020-09-15 | End: 2020-09-15 | Stop reason: SDUPTHER

## 2020-09-15 RX ORDER — BUPIVACAINE HYDROCHLORIDE AND EPINEPHRINE 5; 5 MG/ML; UG/ML
INJECTION, SOLUTION EPIDURAL; INTRACAUDAL; PERINEURAL
Status: COMPLETED | OUTPATIENT
Start: 2020-09-15 | End: 2020-09-15

## 2020-09-15 RX ORDER — CIPROFLOXACIN 2 MG/ML
400 INJECTION, SOLUTION INTRAVENOUS EVERY 12 HOURS
Status: DISCONTINUED | OUTPATIENT
Start: 2020-09-15 | End: 2020-09-15 | Stop reason: HOSPADM

## 2020-09-15 RX ORDER — SODIUM CHLORIDE, SODIUM LACTATE, POTASSIUM CHLORIDE, CALCIUM CHLORIDE 600; 310; 30; 20 MG/100ML; MG/100ML; MG/100ML; MG/100ML
INJECTION, SOLUTION INTRAVENOUS CONTINUOUS PRN
Status: COMPLETED | OUTPATIENT
Start: 2020-09-15 | End: 2020-09-15

## 2020-09-15 RX ORDER — PROPOFOL 10 MG/ML
INJECTION, EMULSION INTRAVENOUS PRN
Status: DISCONTINUED | OUTPATIENT
Start: 2020-09-15 | End: 2020-09-15 | Stop reason: SDUPTHER

## 2020-09-15 RX ORDER — HYDROCODONE BITARTRATE AND ACETAMINOPHEN 5; 325 MG/1; MG/1
1 TABLET ORAL
Status: COMPLETED | OUTPATIENT
Start: 2020-09-15 | End: 2020-09-15

## 2020-09-15 RX ORDER — ONDANSETRON 2 MG/ML
4 INJECTION INTRAMUSCULAR; INTRAVENOUS
Status: DISCONTINUED | OUTPATIENT
Start: 2020-09-15 | End: 2020-09-15 | Stop reason: HOSPADM

## 2020-09-15 RX ORDER — SODIUM CHLORIDE 9 MG/ML
INJECTION, SOLUTION INTRAVENOUS CONTINUOUS PRN
Status: DISCONTINUED | OUTPATIENT
Start: 2020-09-15 | End: 2020-09-15 | Stop reason: SDUPTHER

## 2020-09-15 RX ORDER — SUCCINYLCHOLINE CHLORIDE 20 MG/ML
INJECTION INTRAMUSCULAR; INTRAVENOUS PRN
Status: DISCONTINUED | OUTPATIENT
Start: 2020-09-15 | End: 2020-09-15 | Stop reason: SDUPTHER

## 2020-09-15 RX ORDER — MAGNESIUM HYDROXIDE 1200 MG/15ML
LIQUID ORAL CONTINUOUS PRN
Status: COMPLETED | OUTPATIENT
Start: 2020-09-15 | End: 2020-09-15

## 2020-09-15 RX ORDER — ONDANSETRON 2 MG/ML
INJECTION INTRAMUSCULAR; INTRAVENOUS PRN
Status: DISCONTINUED | OUTPATIENT
Start: 2020-09-15 | End: 2020-09-15 | Stop reason: SDUPTHER

## 2020-09-15 RX ORDER — KETOROLAC TROMETHAMINE 30 MG/ML
INJECTION, SOLUTION INTRAMUSCULAR; INTRAVENOUS PRN
Status: DISCONTINUED | OUTPATIENT
Start: 2020-09-15 | End: 2020-09-15 | Stop reason: SDUPTHER

## 2020-09-15 RX ORDER — KETAMINE HYDROCHLORIDE 10 MG/ML
INJECTION, SOLUTION INTRAMUSCULAR; INTRAVENOUS PRN
Status: DISCONTINUED | OUTPATIENT
Start: 2020-09-15 | End: 2020-09-15 | Stop reason: SDUPTHER

## 2020-09-15 RX ORDER — HYDROMORPHONE HCL 110MG/55ML
0.25 PATIENT CONTROLLED ANALGESIA SYRINGE INTRAVENOUS EVERY 5 MIN PRN
Status: DISCONTINUED | OUTPATIENT
Start: 2020-09-15 | End: 2020-09-15 | Stop reason: HOSPADM

## 2020-09-15 RX ORDER — DEXAMETHASONE SODIUM PHOSPHATE 4 MG/ML
INJECTION, SOLUTION INTRA-ARTICULAR; INTRALESIONAL; INTRAMUSCULAR; INTRAVENOUS; SOFT TISSUE PRN
Status: DISCONTINUED | OUTPATIENT
Start: 2020-09-15 | End: 2020-09-15 | Stop reason: SDUPTHER

## 2020-09-15 RX ORDER — MAGNESIUM SULFATE HEPTAHYDRATE 500 MG/ML
INJECTION, SOLUTION INTRAMUSCULAR; INTRAVENOUS PRN
Status: DISCONTINUED | OUTPATIENT
Start: 2020-09-15 | End: 2020-09-15 | Stop reason: SDUPTHER

## 2020-09-15 RX ORDER — LIDOCAINE HYDROCHLORIDE 10 MG/ML
1 INJECTION, SOLUTION EPIDURAL; INFILTRATION; INTRACAUDAL; PERINEURAL
Status: DISCONTINUED | OUTPATIENT
Start: 2020-09-15 | End: 2020-09-15 | Stop reason: HOSPADM

## 2020-09-15 RX ORDER — MIDAZOLAM HYDROCHLORIDE 1 MG/ML
INJECTION INTRAMUSCULAR; INTRAVENOUS PRN
Status: DISCONTINUED | OUTPATIENT
Start: 2020-09-15 | End: 2020-09-15 | Stop reason: SDUPTHER

## 2020-09-15 RX ORDER — VECURONIUM BROMIDE 1 MG/ML
INJECTION, POWDER, LYOPHILIZED, FOR SOLUTION INTRAVENOUS PRN
Status: DISCONTINUED | OUTPATIENT
Start: 2020-09-15 | End: 2020-09-15 | Stop reason: SDUPTHER

## 2020-09-15 RX ORDER — HYDROMORPHONE HCL 110MG/55ML
0.5 PATIENT CONTROLLED ANALGESIA SYRINGE INTRAVENOUS EVERY 5 MIN PRN
Status: DISCONTINUED | OUTPATIENT
Start: 2020-09-15 | End: 2020-09-15 | Stop reason: HOSPADM

## 2020-09-15 RX ADMIN — SUGAMMADEX 100 MG: 100 INJECTION, SOLUTION INTRAVENOUS at 13:51

## 2020-09-15 RX ADMIN — PROPOFOL 25 MG: 10 INJECTION, EMULSION INTRAVENOUS at 13:25

## 2020-09-15 RX ADMIN — KETOROLAC TROMETHAMINE 30 MG: 30 INJECTION, SOLUTION INTRAMUSCULAR; INTRAVENOUS at 13:46

## 2020-09-15 RX ADMIN — MIDAZOLAM 2 MG: 1 INJECTION INTRAMUSCULAR; INTRAVENOUS at 12:40

## 2020-09-15 RX ADMIN — DEXAMETHASONE SODIUM PHOSPHATE 4 MG: 4 INJECTION, SOLUTION INTRAMUSCULAR; INTRAVENOUS at 12:55

## 2020-09-15 RX ADMIN — FENTANYL CITRATE 50 MCG: 50 INJECTION, SOLUTION INTRAMUSCULAR; INTRAVENOUS at 12:48

## 2020-09-15 RX ADMIN — PHENYLEPHRINE HYDROCHLORIDE 100 MCG: 10 INJECTION INTRAVENOUS at 12:50

## 2020-09-15 RX ADMIN — CIPROFLOXACIN 400 MG: 2 INJECTION, SOLUTION INTRAVENOUS at 12:35

## 2020-09-15 RX ADMIN — ONDANSETRON 4 MG: 2 INJECTION INTRAMUSCULAR; INTRAVENOUS at 12:55

## 2020-09-15 RX ADMIN — LIDOCAINE HYDROCHLORIDE 100 MG: 20 INJECTION, SOLUTION EPIDURAL; INFILTRATION; INTRACAUDAL; PERINEURAL at 12:48

## 2020-09-15 RX ADMIN — KETAMINE HYDROCHLORIDE 10 MG: 10 INJECTION, SOLUTION INTRAMUSCULAR; INTRAVENOUS at 13:20

## 2020-09-15 RX ADMIN — PROPOFOL 100 MG: 10 INJECTION, EMULSION INTRAVENOUS at 12:48

## 2020-09-15 RX ADMIN — PHENYLEPHRINE HYDROCHLORIDE 100 MCG: 10 INJECTION INTRAVENOUS at 12:55

## 2020-09-15 RX ADMIN — FENTANYL CITRATE 50 MCG: 50 INJECTION, SOLUTION INTRAMUSCULAR; INTRAVENOUS at 13:02

## 2020-09-15 RX ADMIN — VECURONIUM BROMIDE 5 MG: 1 INJECTION, POWDER, LYOPHILIZED, FOR SOLUTION INTRAVENOUS at 12:55

## 2020-09-15 RX ADMIN — SODIUM CHLORIDE: 9 INJECTION, SOLUTION INTRAVENOUS at 12:38

## 2020-09-15 RX ADMIN — FAMOTIDINE 20 MG: 10 INJECTION INTRAVENOUS at 12:30

## 2020-09-15 RX ADMIN — MAGNESIUM SULFATE HEPTAHYDRATE 1 G: 500 INJECTION, SOLUTION INTRAMUSCULAR; INTRAVENOUS at 13:18

## 2020-09-15 RX ADMIN — KETAMINE HYDROCHLORIDE 10 MG: 10 INJECTION, SOLUTION INTRAMUSCULAR; INTRAVENOUS at 13:54

## 2020-09-15 RX ADMIN — KETAMINE HYDROCHLORIDE 10 MG: 10 INJECTION, SOLUTION INTRAMUSCULAR; INTRAVENOUS at 13:33

## 2020-09-15 RX ADMIN — SODIUM CHLORIDE: 9 INJECTION, SOLUTION INTRAVENOUS at 14:04

## 2020-09-15 RX ADMIN — SUCCINYLCHOLINE CHLORIDE 100 MG: 20 INJECTION, SOLUTION INTRAMUSCULAR; INTRAVENOUS at 12:48

## 2020-09-15 RX ADMIN — HYDROCODONE BITARTRATE AND ACETAMINOPHEN 1 TABLET: 5; 325 TABLET ORAL at 14:54

## 2020-09-15 ASSESSMENT — PULMONARY FUNCTION TESTS
PIF_VALUE: 17
PIF_VALUE: 27
PIF_VALUE: 14
PIF_VALUE: 26
PIF_VALUE: 28
PIF_VALUE: 28
PIF_VALUE: 15
PIF_VALUE: 6
PIF_VALUE: 27
PIF_VALUE: 27
PIF_VALUE: 28
PIF_VALUE: 28
PIF_VALUE: 16
PIF_VALUE: 27
PIF_VALUE: 27
PIF_VALUE: 18
PIF_VALUE: 28
PIF_VALUE: 26
PIF_VALUE: 15
PIF_VALUE: 26
PIF_VALUE: 17
PIF_VALUE: 15
PIF_VALUE: 25
PIF_VALUE: 27
PIF_VALUE: 27
PIF_VALUE: 4
PIF_VALUE: 28
PIF_VALUE: 27
PIF_VALUE: 1
PIF_VALUE: 28
PIF_VALUE: 17
PIF_VALUE: 14
PIF_VALUE: 27
PIF_VALUE: 28
PIF_VALUE: 19
PIF_VALUE: 26
PIF_VALUE: 21
PIF_VALUE: 5
PIF_VALUE: 1
PIF_VALUE: 17
PIF_VALUE: 25
PIF_VALUE: 19
PIF_VALUE: 28
PIF_VALUE: 19
PIF_VALUE: 1
PIF_VALUE: 1
PIF_VALUE: 27
PIF_VALUE: 15
PIF_VALUE: 14
PIF_VALUE: 1
PIF_VALUE: 27
PIF_VALUE: 27
PIF_VALUE: 15
PIF_VALUE: 28
PIF_VALUE: 27
PIF_VALUE: 23
PIF_VALUE: 0
PIF_VALUE: 17
PIF_VALUE: 0
PIF_VALUE: 17
PIF_VALUE: 26
PIF_VALUE: 2
PIF_VALUE: 15
PIF_VALUE: 18
PIF_VALUE: 27
PIF_VALUE: 16
PIF_VALUE: 27
PIF_VALUE: 24
PIF_VALUE: 24
PIF_VALUE: 28
PIF_VALUE: 26
PIF_VALUE: 28
PIF_VALUE: 26
PIF_VALUE: 15
PIF_VALUE: 28
PIF_VALUE: 23
PIF_VALUE: 27
PIF_VALUE: 28
PIF_VALUE: 28
PIF_VALUE: 27
PIF_VALUE: 26
PIF_VALUE: 27
PIF_VALUE: 2
PIF_VALUE: 28

## 2020-09-15 ASSESSMENT — PAIN - FUNCTIONAL ASSESSMENT: PAIN_FUNCTIONAL_ASSESSMENT: 0-10

## 2020-09-15 ASSESSMENT — PAIN SCALES - GENERAL
PAINLEVEL_OUTOF10: 4
PAINLEVEL_OUTOF10: 5

## 2020-09-15 ASSESSMENT — PAIN DESCRIPTION - PAIN TYPE: TYPE: SURGICAL PAIN

## 2020-09-15 ASSESSMENT — PAIN DESCRIPTION - LOCATION: LOCATION: ABDOMEN

## 2020-09-15 NOTE — BRIEF OP NOTE
Brief Postoperative Note      Patient: Mick Martinez  YOB: 1955  MRN: [id number]    Date of Procedure: 9/15/2020    Pre-Op Diagnosis: K40.90  RIGHT INGUINAL HERNIA    Post-Op Diagnosis: Same       Procedure(s):  ROBOT ASSISTED LAPAROSCOPIC RIGHT INGUINAL HERNIA REPAIR WITH MESH    Surgeon(s):  Smith Buckley MD    Assistant:  Surgical Assistant: Bruno Walker RN    Anesthesia: General    Estimated Blood Loss (mL): Minimal    Complications: None    Specimens:   * No specimens in log *    Implants:  Implant Name Type Inv.  Item Serial No.  Lot No. LRB No. Used Action   MESH SURG ELICIA GROIN 3DMAX LG RT 4X6IN Mesh MESH SURG ELICIA GROIN 3DMAX LG RT 4X6IN  [employer] [id number] Implanted         Drains: * No LDAs found *    Findings: indirect LIH    Electronically signed by Smith Buckley MD on 9/15/2020 at 1:50 PM

## 2020-09-15 NOTE — PROGRESS NOTES
CLINICAL PHARMACY NOTE: MEDS TO [address] [place] [address] Drive Select Patient?: No  Total # of Prescriptions Filled: 1   The following medications were delivered to the patient:  · Norco 5-325mg  · Ondansetron 4mg  Total # of Interventions Completed: 0  Time Spent (min): 15    Additional Documentation:    Delivered to Patient    Luis Miguel Gaitan CPhT

## 2020-09-15 NOTE — ANESTHESIA PRE PROCEDURE
Department of Anesthesiology  Preprocedure Note       Name:  Palmira Pearson   Age:  63 y.o.  :  1955                                          MRN:  [id number]         Date:  9/15/2020      Surgeon: Rena Marroquin):  Yarely Pollock MD    Procedure: Procedure(s):  ROBOT ASSISTED LAPAROSCOPIC RIGHT INGUINAL HERNIA REPAIR WITH MESH    Medications prior to admission:   Prior to Admission medications    Medication Sig Start Date End Date Taking?  Authorizing Provider   ondansetron (ZOFRAN) 4 MG tablet Take 1 tablet by mouth every 12 hours as needed for Nausea or Vomiting 20   Shant Correa MD   famotidine (PEPCID) 20 MG tablet Take 1 tablet by mouth 2 times daily  Patient not taking: Reported on 9/10/2020 7/31/20   Shant Correa MD   losartan (COZAAR) 50 MG tablet Take 1 tablet by mouth daily  Patient taking differently: Take 50 mg by mouth nightly  20   Shant Correa MD   Icosapent Ethyl (VASCEPA) 1 g CAPS capsule TAKE 2 CAPSULES BY MOUTH TWICE DAILY 20   Shant Correa MD   gemfibrozil (LOPID) 600 MG tablet Take 1 tablet by mouth 2 times daily (before meals) 20   Shant Correa MD   fluticasone (FLONASE) 50 MCG/ACT nasal spray 1 spray by Nasal route daily  Patient taking differently: 1 spray by Nasal route daily as needed  20   Shant Correa MD   citalopram (CELEXA) 40 MG tablet Take 1 tablet by mouth daily 20   Shant Correa MD   buPROPion (WELLBUTRIN XL) 150 MG extended release tablet Take 1 tablet by mouth every morning  Patient not taking: Reported on 9/10/2020 7/7/20   Shant Correa MD   rosuvastatin (CRESTOR) 20 MG tablet Take 1 tablet by mouth nightly 20   Shant Correa MD   traZODone (DESYREL) 100 MG tablet 1 po qhs 20   Valente Reid MD   methylfolate (DEPLIN) 7.5 MG TABS tablet Take 7.5 tablets by mouth daily 19   Anisa Reid MD   pantoprazole (PROTONIX) 20 MG tablet TAKE 1 TABLET BY MOUTH DAILY 3/20/18   Shant Correa MD   PROAIR  (90 BASE) MCG/ACT inhaler INHALE 2 PUFFS INTO THE LUNGS EVERY 6 HOURS AS NEEDED FOR WHEEZING  Patient not taking: Reported on 9/10/2020 5/8/17   Suzan Moe MD   Biotin 10 MG tablet Take 10 mg by mouth daily    Historical Provider, MD   Cholecalciferol (VITAMIN D) 2000 UNITS CAPS capsule Take by mouth    Historical Provider, MD   vitamin B-12 (CYANOCOBALAMIN) 100 MCG tablet Take 50 mcg by mouth daily    Historical Provider, MD   Pyridoxine HCl (VITAMIN B-6) 50 MG tablet Take 50 mg by mouth daily    Historical Provider, MD       Current medications:    Current Facility-Administered Medications   Medication Dose Route Frequency Provider Last Rate Last Dose    HYDROcodone-acetaminophen (NORCO) 5-325 MG per tablet 1 tablet  1 tablet Oral Once PRN Leelee House MD        ondansetron [facility]) injection 4 mg  4 mg Intravenous Once PRN Leelee House MD        HYDROmorphone (DILAUDID) injection 0.25 mg  0.25 mg Intravenous Q5 Min PRN Leelee House MD        HYDROmorphone (DILAUDID) injection 0.5 mg  0.5 mg Intravenous Q5 Min PRN Leelee House MD           Allergies: Allergies   Allergen Reactions    Asa [Aspirin]     Atorvastatin Diarrhea    Cephalexin     Naproxen      Sleepy, GI upset       Problem List:    Patient Active Problem List   Diagnosis Code    Steatohepatitis K75.81    RA (rheumatoid arthritis) ([facility]) M06.9    Anxiety F41.9    BP (high blood pressure) I10    Hyperlipidemia E78.5    Allergic rhinitis J30.9    Menopause Z78.0    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 0X9A    Fracture of multiple ribs S22.49XA    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 ([facility] M05.731    Pure hypercholesterolemia E78.00    Hypertriglyceridemia, familial E78.1    Concussion with < 1 hr loss of consciousness S06. 0X9A    Insomnia G47.00    Dry mouth R68.2    Gastroduodenitis K29.90    Alopecia L65.9    Rash R21    MGUS (monoclonal gammopathy of unknown significance) D47.2    Macrocytic anemia D53.9    Loss of hair L65.9    Arthritis of hand, degenerative M19.049    Essential hypertension I10    Dry eyes H04.123    Joint swelling M25.40    Elevated rheumatoid factor R76.8    Synovial cyst M71.30    Hemorrhoid K64.9    Iron deficiency anemia D50.9       Past Medical History:        Diagnosis Date    Allergic     Anemia     Anxiety     Depression     Hyperlipidemia     Hypertension     RA (rheumatoid arthritis) ([address]     Steatohepatitis        Past Surgical History:        Procedure Laterality Date     SECTION       SECTION      COLONOSCOPY      With EGD    HYSTERECTOMY      RHINOPLASTY      TUBAL LIGATION      UPPER GASTROINTESTINAL ENDOSCOPY      With colonoscopy       Social History:    Social History     Tobacco Use    Smoking status: Former Smoker     Packs/day: 0.25     Years: 5.00     Pack years: 1.25     Types: Cigarettes     Last attempt to quit: 2005     Years since quitting: 15.7    Smokeless tobacco: Never Used   Substance Use Topics    Alcohol use: Yes     Alcohol/week: 14.0 standard drinks     Types: 14 Glasses of wine per week                                Counseling given: Not Answered      Vital Signs (Current):   Vitals:    09/10/20 1431   Weight: 119 lb (54 kg)   Height: 5' 1\" (1.549 m)                                              BP Readings from Last 3 Encounters:   20 116/68   20 120/74   20 110/60       NPO Status:                                                                                 BMI:   Wt Readings from Last 3 Encounters:   09/10/20 119 lb (54 kg)   20 126 lb 3.2 oz (57.2 kg)   20 121 lb 9.6 oz (55.2 kg)     Body mass index is 22.48 kg/m².     CBC:   Lab Results   Component Value Date    WBC 3.3 2020    RBC 2.97 08/06/2020    RBC 3.70 10/07/2016    HGB 10.5 08/06/2020    HCT 31.2 08/06/2020    .0 08/06/2020    RDW 13.7 08/06/2020     08/06/2020       CMP:   Lab Results   Component Value Date     08/06/2020    K 4.3 08/06/2020     08/06/2020    CO2 20 08/06/2020    BUN 18 08/06/2020    CREATININE 1.4 08/06/2020    GFRAA 46 08/06/2020    AGRATIO 1.4 08/06/2020    LABGLOM 38 08/06/2020    GLUCOSE 112 08/06/2020    GLUCOSE 92 05/27/2016    PROT 7.3 08/06/2020    PROT 9.0 05/27/2016    CALCIUM 10.4 08/06/2020    BILITOT <0.2 08/06/2020    ALKPHOS 153 08/06/2020    AST 63 08/06/2020    ALT 28 08/06/2020       POC Tests: No results for input(s): POCGLU, POCNA, POCK, POCCL, POCBUN, POCHEMO, POCHCT in the last 72 hours.     Coags:   Lab Results   Component Value Date    PROTIME 13.6 09/16/2014    INR 1.1 09/16/2014       HCG (If Applicable): No results found for: PREGTESTUR, PREGSERUM, HCG, HCGQUANT     ABGs: No results found for: PHART, PO2ART, CQH7AXX, BRY0EXQ, BEART, B2SPMDJD     Type & Screen (If Applicable):  No results found for: LABABO, LABRH    Drug/Infectious Status (If Applicable):  No results found for: HIV, HEPCAB    COVID-19 Screening (If Applicable):   Lab Results   Component Value Date    COVID19 NOT DETECTED 09/10/2020         Anesthesia Evaluation  Patient summary reviewed no history of anesthetic complications:   Airway: Mallampati: II  TM distance: >3 FB   Neck ROM: full  Mouth opening: > = 3 FB Dental: normal exam         Pulmonary: breath sounds clear to auscultation                            ROS comment: Former smoker   Cardiovascular:  Exercise tolerance: good (>4 METS),   (+) hypertension:, hyperlipidemia    (-) CABG/stent, dysrhythmias and  angina      Rhythm: regular  Rate: normal                    Neuro/Psych:   (+) psychiatric history:   (-) seizures, TIA and CVA           GI/Hepatic/Renal:   (+) GERD: well controlled, liver disease:,           Endo/Other:    (+) :

## 2020-09-15 NOTE — ANESTHESIA POSTPROCEDURE EVALUATION
Department of Anesthesiology  Postprocedure Note    Patient: Franklyn Sorenson  MRN: [id number]  YOB: 1955  Date of evaluation: 9/15/2020  Time:  2:35 PM     Procedure Summary     Date:  09/15/20 Room / Location:  [address]    Anesthesia Start:  2021 Anesthesia Stop:  5961    Procedure:  ROBOT ASSISTED LAPAROSCOPIC RIGHT INGUINAL HERNIA REPAIR WITH MESH (Right Abdomen) Diagnosis:  (K40.90  RIGHT INGUINAL HERNIA)    Surgeon:  Desiree Corbett MD Responsible Provider:  Medina Smith MD    Anesthesia Type:  general ASA Status:  3          Anesthesia Type: general    Clyde Phase I:      Clyde Phase II:      Last vitals: Reviewed and per EMR flowsheets.        Anesthesia Post Evaluation    Patient location during evaluation: PACU  Patient participation: complete - patient participated  Level of consciousness: awake  Airway patency: patent  Nausea & Vomiting: no vomiting  Complications: no  Cardiovascular status: hemodynamically stable  Respiratory status: acceptable  Hydration status: euvolemic

## 2020-09-15 NOTE — PROGRESS NOTES
Pt arrived from OR to PACU, awakens to voice. VSS, O2 sats 99% on 6 L simple mask. Incisions on abdomen dry and intact, abdomen soft. Ice pack placed. Will monitor.

## 2020-09-15 NOTE — PROGRESS NOTES
Pt resting quietly in bed with eyes closed, awakens to voice, rates pain as a 4 out of 10 in abdomen, states it is tolerable for her. VSS, O2 sats 92% on room air. Incisions on abdomen dry and intact, abdomen soft. Pt seen by anesthesia, phase 1 criteria met. Will transfer pt to same day for discharge.

## 2020-09-16 NOTE — OP NOTE
The pubic tubercle as well as Maxi's ligament was cleared  laterally to the level of the obturator vein. The lateral space was  cleared with blunt dissection as well as cautery. The patient had a  large lipoma of the cord. This was dissected down to below the  anticipated inferior margin of the mesh. She also had a  small-to-moderate sized indirect inguinal hernia. The round ligament  was actually divided above the sac. The sac was then dissected down to  below the anticipated inferior margin of the mesh. We selected a large 3DMax mesh. The mesh was positioned in the pocket. It was sutured in place with a 2-0 Vicryl suture to Maxi's ligament. Two sutures were placed on the upper medial aspect of the mesh and the  upper lateral aspect of the mesh. We placed one tacking suture medially  as well. The mesh did lay flat and covered all hernia spaces. We had  excellent hemostasis. The peritoneal flap was closed with a running 2-0  V-Loc suture. We had excellent hemostasis. The trocars were removed under direct  visualization and the abdomen was de-insufflated. All the trocar sites had been previously injected with 0.5% Marcaine  with epinephrine. The skin and the incisions were closed with running  4-0 subcuticular sutures. Dermabond was then applied. The patient  tolerated the procedure without difficulty and was transferred to  recovery room in stable condition. Sherri Constantino.  Oral MD Belen    D: 09/15/2020 14:06:05       T: 09/15/2020 14:12:02     [email or web address]  Job#: [id number]     Doc#: [id number]    CC:  Holly Eaton MD

## 2020-09-29 ENCOUNTER — OFFICE VISIT (OUTPATIENT)
Dept: FAMILY MEDICINE CLINIC | Age: 65
End: 2020-09-29
Payer: COMMERCIAL

## 2020-09-29 VITALS
OXYGEN SATURATION: 96 % | SYSTOLIC BLOOD PRESSURE: 116 MMHG | HEIGHT: 61 IN | TEMPERATURE: 96.4 F | BODY MASS INDEX: 24.32 KG/M2 | WEIGHT: 128.8 LBS | HEART RATE: 92 BPM | DIASTOLIC BLOOD PRESSURE: 64 MMHG

## 2020-09-29 PROCEDURE — G8420 CALC BMI NORM PARAMETERS: HCPCS | Performed by: FAMILY MEDICINE

## 2020-09-29 PROCEDURE — 99214 OFFICE O/P EST MOD 30 MIN: CPT | Performed by: FAMILY MEDICINE

## 2020-09-29 PROCEDURE — G8427 DOCREV CUR MEDS BY ELIG CLIN: HCPCS | Performed by: FAMILY MEDICINE

## 2020-09-29 PROCEDURE — 90471 IMMUNIZATION ADMIN: CPT | Performed by: FAMILY MEDICINE

## 2020-09-29 PROCEDURE — 3017F COLORECTAL CA SCREEN DOC REV: CPT | Performed by: FAMILY MEDICINE

## 2020-09-29 PROCEDURE — 1036F TOBACCO NON-USER: CPT | Performed by: FAMILY MEDICINE

## 2020-09-29 PROCEDURE — 90686 IIV4 VACC NO PRSV 0.5 ML IM: CPT | Performed by: FAMILY MEDICINE

## 2020-09-29 RX ORDER — TRAZODONE HYDROCHLORIDE 100 MG/1
TABLET ORAL
Qty: 30 TABLET | Refills: 2 | Status: SHIPPED | OUTPATIENT
Start: 2020-09-29 | End: 2021-01-18 | Stop reason: SDUPTHER

## 2020-09-29 RX ORDER — ROSUVASTATIN CALCIUM 20 MG/1
20 TABLET, COATED ORAL NIGHTLY
Qty: 30 TABLET | Refills: 2 | Status: SHIPPED | OUTPATIENT
Start: 2020-09-29 | End: 2021-01-18 | Stop reason: SDUPTHER

## 2020-09-29 RX ORDER — GEMFIBROZIL 600 MG/1
600 TABLET, FILM COATED ORAL
Qty: 60 TABLET | Refills: 2 | Status: SHIPPED | OUTPATIENT
Start: 2020-09-29 | End: 2021-01-18 | Stop reason: SDUPTHER

## 2020-09-29 RX ORDER — ICOSAPENT ETHYL 1000 MG/1
CAPSULE ORAL
Qty: 120 CAPSULE | Refills: 2 | Status: SHIPPED | OUTPATIENT
Start: 2020-09-29 | End: 2021-01-18 | Stop reason: SDUPTHER

## 2020-09-29 RX ORDER — BUPROPION HYDROCHLORIDE 150 MG/1
150 TABLET ORAL EVERY MORNING
Qty: 30 TABLET | Refills: 2 | Status: SHIPPED | OUTPATIENT
Start: 2020-09-29 | End: 2021-04-16 | Stop reason: SDUPTHER

## 2020-09-29 RX ORDER — LOSARTAN POTASSIUM 50 MG/1
50 TABLET ORAL DAILY
Qty: 30 TABLET | Refills: 2 | Status: SHIPPED | OUTPATIENT
Start: 2020-09-29 | End: 2021-01-18 | Stop reason: SDUPTHER

## 2020-09-29 RX ORDER — FLUTICASONE PROPIONATE 50 MCG
1 SPRAY, SUSPENSION (ML) NASAL DAILY PRN
Qty: 1 BOTTLE | Refills: 2 | Status: SHIPPED | OUTPATIENT
Start: 2020-09-29

## 2020-09-29 RX ORDER — CITALOPRAM 40 MG/1
40 TABLET ORAL DAILY
Qty: 30 TABLET | Refills: 2 | Status: SHIPPED | OUTPATIENT
Start: 2020-09-29 | End: 2021-01-18 | Stop reason: SDUPTHER

## 2020-09-29 ASSESSMENT — ENCOUNTER SYMPTOMS
SHORTNESS OF BREATH: 0
ABDOMINAL PAIN: 0
CHEST TIGHTNESS: 0
DIARRHEA: 0
BLOOD IN STOOL: 0
COUGH: 0
CONSTIPATION: 0

## 2020-09-29 NOTE — PROGRESS NOTES
SUBJECTIVE:  Panchito Jones   1955   female   Allergies   Allergen Reactions    Asa [Aspirin]     Atorvastatin Diarrhea    Cephalexin     Naproxen      Sleepy, GI upset       Chief Complaint   Patient presents with    Depression    Hyperlipidemia        Patient Active Problem List    Diagnosis Date Noted    Non-recurrent unilateral inguinal hernia without obstruction or gangrene     Hemorrhoid 04/28/2017    Iron deficiency anemia 04/28/2017    Essential hypertension 10/25/2016    MGUS (monoclonal gammopathy of unknown significance) 10/07/2016    Macrocytic anemia 10/07/2016    Insomnia 02/07/2016    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 ([address] 11/02/2015    Pure hypercholesterolemia 11/02/2015    Hypertriglyceridemia, familial 11/02/2015    Concussion with brief (less than one hour) loss of consciousness 09/01/2015    Concussion with < 1 hr loss of consciousness 09/01/2015    Fracture of multiple ribs 08/27/2015    Gastroduodenitis 07/01/2015    Heterozygous MTHFR mutation [id number] ([address] 12/16/2014    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[address] 12/16/2014    Vitamin D deficiency 12/16/2014     Overview:   Replaced inactive diagnosis term via diagnosis import      Allergic rhinitis 04/28/2014    Menopause 04/28/2014    BP (high blood pressure) 12/15/2013    Steatohepatitis     RA (rheumatoid arthritis) ([address]     Anxiety     Hyperlipidemia     Synovial cyst 05/14/2013    Arthritis of hand, degenerative 02/14/2013    Alopecia 11/27/2012    Loss of hair 11/27/2012    Dry mouth 11/13/2012    Rash 11/13/2012    Dry eyes 11/13/2012    Joint swelling 11/13/2012    Elevated rheumatoid factor 11/13/2012       HPI   Pt Is here today for fu on GERD, intermittent anemia, hyperlipidemia, prediabetes, HTN, depression/anxiety and chronic insomnia. She is s/p inguinal hernia repair 2 weeks ago and recovering well. Denies fever/chills, abdominal pain or change in appetite. She has noticed itching around incisions and wondering if they are infected. Has post op appt with Dr. Macario Palomino next week. She has been doing well on current meds. Denies CP,SOB or myalgias. No ha,change in vision or neurologic sx. She has had intermittent mild anemia . Has been evaluated by GI and hematology and no source of anemia found. Denies GI/GERD sx. No blood in stool or dark/tarry stools. Has hx of diabetes and last HgA1c 5.7. chol has been good. Denies sx of depression or anxiety. Sleeping well with Trazodone. Past Medical History:   Diagnosis Date    Allergic     Anemia     Anxiety     Depression     Hyperlipidemia     Hypertension     [facility]) [facility]     Steatohepatitis      Social History     Socioeconomic History    Marital status:      Spouse name: Not on file    Number of children: Not on file    Years of education: Not on file    Highest education level: Not on file   Occupational History    Not on file   Social Needs    Financial resource strain: Not on file    Food insecurity     Worry: Not on file     Inability: Not on file    Transportation needs     Medical: Not on file     Non-medical: Not on file   Tobacco Use    Smoking status: Former Smoker     Packs/day: 0.25     Years: 5.00     Pack years: 1.25     Types: Cigarettes     Last attempt to quit: 2005     Years since quitting: 15.7    Smokeless tobacco: Never Used   Substance and Sexual Activity    Alcohol use:  Yes     Alcohol/week: 14.0 standard drinks     Types: 14 Glasses of wine per week    Drug use: No    Sexual activity: Not on file   Lifestyle    Physical activity     Days per week: Not on file     Minutes per session: Not on file    Stress: Not on file   Relationships    Social connections     Talks on phone: Not on file     Gets together: Not on file     Attends Anabaptism service: Not on file     Active member of club or organization: Not on file     Attends meetings of clubs or There is no abdominal tenderness. There is no guarding or rebound. Comments: Small surgical incisions are healed. No signs of infection. Mild healing bruises around the area   Skin:     Findings: No rash. Neurological:      Mental Status: She is alert and oriented to person, place, and time. Psychiatric:         Behavior: Behavior normal.         Thought Content: Thought content normal.         ASSESSMENT/PLAN:    1. Gastroesophageal reflux disease, esophagitis presence not specified  GERD precautions  Refill meds    2. Anemia, unspecified type  Will continue to monitor  Continue fu with hematology as advised    3. Hyperlipidemia, unspecified hyperlipidemia type  Refill meds  appr diet mgt  Reviewed last labs    4. Prediabetes  Reviewed last labs  Discussed appr diet changes  Will continue to monitor      5. Essential hypertension  Refill meds  Intermittent ambulatory BP checks    6. Insomnia, unspecified type  appr sleep hygiene reviewed  Refill Trazodone    7. Anxiety  Stress mgt  Celexa refill      8.  Need for vaccination    - INFLUENZA, QUADV, 3 YRS AND OLDER, IM PF, PREFILL SYR OR SDV, 0.5ML (AFLURIA QUADV, PF)      Orders Placed This Encounter   Procedures    INFLUENZA, QUADV, 3 YRS AND OLDER, IM PF, PREFILL SYR OR SDV, 0.5ML (AFLURIA QUADV, PF)     Current Outpatient Medications   Medication Sig Dispense Refill    losartan (COZAAR) 50 MG tablet Take 1 tablet by mouth daily 30 tablet 2    gemfibrozil (LOPID) 600 MG tablet Take 1 tablet by mouth 2 times daily (before meals) 60 tablet 2    traZODone (DESYREL) 100 MG tablet 1 po qhs 30 tablet 2    rosuvastatin (CRESTOR) 20 MG tablet Take 1 tablet by mouth nightly 30 tablet 2    buPROPion (WELLBUTRIN XL) 150 MG extended release tablet Take 1 tablet by mouth every morning 30 tablet 2    citalopram (CELEXA) 40 MG tablet Take 1 tablet by mouth daily 30 tablet 2    fluticasone (FLONASE) 50 MCG/ACT nasal spray 1 spray by Nasal route daily as needed for Rhinitis or Allergies 1 Bottle 2    Icosapent Ethyl (VASCEPA) 1 g CAPS capsule TAKE 2 CAPSULES BY MOUTH TWICE DAILY 120 capsule 2    ondansetron (ZOFRAN) 4 MG tablet Take 1 tablet by mouth every 12 hours as needed for Nausea or Vomiting 8 tablet 0    famotidine (PEPCID) 20 MG tablet Take 1 tablet by mouth 2 times daily (Patient not taking: Reported on 9/10/2020) 60 tablet 1    methylfolate (DEPLIN) 7.5 MG TABS tablet Take 7.5 tablets by mouth daily 30 tablet 1    pantoprazole (PROTONIX) 20 MG tablet TAKE 1 TABLET BY MOUTH DAILY 30 tablet 2    PROAIR  (90 BASE) MCG/ACT inhaler INHALE 2 PUFFS INTO THE LUNGS EVERY 6 HOURS AS NEEDED FOR WHEEZING (Patient not taking: Reported on 9/10/2020) 8.5 g 0    Biotin 10 MG tablet Take 10 mg by mouth daily      Cholecalciferol (VITAMIN D) 2000 UNITS CAPS capsule Take by mouth      vitamin B-12 (CYANOCOBALAMIN) 100 MCG tablet Take 50 mcg by mouth daily      Pyridoxine HCl (VITAMIN B-6) 50 MG tablet Take 50 mg by mouth daily       No current facility-administered medications for this visit. Return in about 3 months (around 12/29/2020), or if symptoms worsen or fail to improve, for hyperlipidmeia, HTN.     Humaira Madrigal MD

## 2020-09-29 NOTE — PROGRESS NOTES
Vaccine Information Sheet, \"Influenza - Inactivated\"  given to Karina Weber, or parent/legal guardian of  Karina Weber and verbalized understanding. Patient responses:    Have you ever had a reaction to a flu vaccine? No  Do you have any current illness? No  Have you ever had [place] Syndrome? No  Do you have a serious allergy to any of the follow: Neomycin, Polymyxin, Thimerosal, eggs or egg products? No    Flu vaccine given per order. Please see immunization tab. Risks and benefits explained. Current VIS given.       Immunizations Administered     Name Date Dose Route    Influenza, Quadv, IM, PF (6 mo and older Fluzone, Flulaval, Fluarix, and 3 yrs and older Afluria) 9/29/2020 0.5 mL Intramuscular    Site: Deltoid- Left    Lot: [id number]    NDC: 75605-566-29

## 2020-10-02 ENCOUNTER — OFFICE VISIT (OUTPATIENT)
Dept: SURGERY | Age: 65
End: 2020-10-02

## 2020-10-02 VITALS
SYSTOLIC BLOOD PRESSURE: 108 MMHG | TEMPERATURE: 96.9 F | BODY MASS INDEX: 23.51 KG/M2 | WEIGHT: 124.4 LBS | DIASTOLIC BLOOD PRESSURE: 60 MMHG

## 2020-10-02 PROCEDURE — 99024 POSTOP FOLLOW-UP VISIT: CPT | Performed by: SURGERY

## 2020-10-02 NOTE — PROGRESS NOTES
Subjective:      Patient ID: Madina Lewis is a 63 y.o. female. HPI    Review of Systems    Objective:   Physical Exam  Incisions healing well  Assessment:      24-year-old female status post robotic laparoscopic right inguinal hernia repair with mesh. Doing well other than having an allergic reaction to the surgical prep. This seems to be improving with time. Plan:      Take Benadryl as needed for itching. Continue lifting restrictions for 2 more weeks. Follow-up as needed.         Whitney Lee MD

## 2020-10-02 NOTE — LETTER
[address]  [address] [address]  Phone: [phone]  Fax: [phone]    October 2, 2020    Patient: Andriy Marinelli  MRN:  [id number]  YOB: 1955  Date of Visit: 10/2/2020    Dear Dr Claudetta Parker: Thank you for the request for consultation for [facility]. Below are the relevant portions of my assessment and plan of care. Assessment:  72-year-old female status post robotic laparoscopic right inguinal hernia repair with mesh. Doing well other than having an allergic reaction to the surgical prep. This seems to be improving with time. Plan:  Take Benadryl as needed for itching. Continue lifting restrictions for 2 more weeks. Follow-up as needed. If you have questions, please do not hesitate to call me.      Sincerely,    Glenna Peck MD    CC providers:    Beatriz Parham MD  [address], [address]

## 2021-01-18 ENCOUNTER — OFFICE VISIT (OUTPATIENT)
Dept: FAMILY MEDICINE CLINIC | Age: 66
End: 2021-01-18
Payer: COMMERCIAL

## 2021-01-18 VITALS
HEART RATE: 108 BPM | DIASTOLIC BLOOD PRESSURE: 70 MMHG | SYSTOLIC BLOOD PRESSURE: 132 MMHG | OXYGEN SATURATION: 95 % | RESPIRATION RATE: 14 BRPM | WEIGHT: 121 LBS | BODY MASS INDEX: 22.86 KG/M2 | TEMPERATURE: 96.3 F

## 2021-01-18 DIAGNOSIS — F41.9 ANXIETY: ICD-10-CM

## 2021-01-18 DIAGNOSIS — K21.9 GASTROESOPHAGEAL REFLUX DISEASE WITHOUT ESOPHAGITIS: ICD-10-CM

## 2021-01-18 DIAGNOSIS — I10 ESSENTIAL HYPERTENSION: ICD-10-CM

## 2021-01-18 DIAGNOSIS — R73.03 PREDIABETES: ICD-10-CM

## 2021-01-18 DIAGNOSIS — M05.731 RHEUMATOID ARTHRITIS INVOLVING RIGHT WRIST WITH POSITIVE RHEUMATOID FACTOR (HCC): ICD-10-CM

## 2021-01-18 DIAGNOSIS — E78.5 HYPERLIPIDEMIA, UNSPECIFIED HYPERLIPIDEMIA TYPE: Primary | ICD-10-CM

## 2021-01-18 DIAGNOSIS — R11.0 NAUSEA: ICD-10-CM

## 2021-01-18 DIAGNOSIS — Z23 NEED FOR VACCINATION: ICD-10-CM

## 2021-01-18 DIAGNOSIS — G47.00 INSOMNIA, UNSPECIFIED TYPE: ICD-10-CM

## 2021-01-18 DIAGNOSIS — F32.A DEPRESSION, UNSPECIFIED DEPRESSION TYPE: ICD-10-CM

## 2021-01-18 PROCEDURE — G8427 DOCREV CUR MEDS BY ELIG CLIN: HCPCS | Performed by: FAMILY MEDICINE

## 2021-01-18 PROCEDURE — 90750 HZV VACC RECOMBINANT IM: CPT | Performed by: FAMILY MEDICINE

## 2021-01-18 PROCEDURE — 1036F TOBACCO NON-USER: CPT | Performed by: FAMILY MEDICINE

## 2021-01-18 PROCEDURE — 3017F COLORECTAL CA SCREEN DOC REV: CPT | Performed by: FAMILY MEDICINE

## 2021-01-18 PROCEDURE — 90471 IMMUNIZATION ADMIN: CPT | Performed by: FAMILY MEDICINE

## 2021-01-18 PROCEDURE — G8420 CALC BMI NORM PARAMETERS: HCPCS | Performed by: FAMILY MEDICINE

## 2021-01-18 PROCEDURE — G8482 FLU IMMUNIZE ORDER/ADMIN: HCPCS | Performed by: FAMILY MEDICINE

## 2021-01-18 PROCEDURE — 99214 OFFICE O/P EST MOD 30 MIN: CPT | Performed by: FAMILY MEDICINE

## 2021-01-18 RX ORDER — GEMFIBROZIL 600 MG/1
600 TABLET, FILM COATED ORAL
Qty: 60 TABLET | Refills: 2 | Status: SHIPPED | OUTPATIENT
Start: 2021-01-18 | End: 2021-04-16 | Stop reason: SDUPTHER

## 2021-01-18 RX ORDER — TRAZODONE HYDROCHLORIDE 100 MG/1
TABLET ORAL
Qty: 30 TABLET | Refills: 2 | Status: SHIPPED | OUTPATIENT
Start: 2021-01-18 | End: 2021-04-16 | Stop reason: SDUPTHER

## 2021-01-18 RX ORDER — ICOSAPENT ETHYL 1000 MG/1
CAPSULE ORAL
Qty: 120 CAPSULE | Refills: 2 | Status: SHIPPED | OUTPATIENT
Start: 2021-01-18 | End: 2021-04-16 | Stop reason: SDUPTHER

## 2021-01-18 RX ORDER — ROSUVASTATIN CALCIUM 20 MG/1
20 TABLET, COATED ORAL NIGHTLY
Qty: 30 TABLET | Refills: 2 | Status: SHIPPED | OUTPATIENT
Start: 2021-01-18 | End: 2021-04-16 | Stop reason: SDUPTHER

## 2021-01-18 RX ORDER — LOSARTAN POTASSIUM 50 MG/1
50 TABLET ORAL DAILY
Qty: 30 TABLET | Refills: 2 | Status: SHIPPED | OUTPATIENT
Start: 2021-01-18 | End: 2021-04-16 | Stop reason: SDUPTHER

## 2021-01-18 RX ORDER — CITALOPRAM 40 MG/1
40 TABLET ORAL DAILY
Qty: 30 TABLET | Refills: 2 | Status: SHIPPED | OUTPATIENT
Start: 2021-01-18 | End: 2021-04-16 | Stop reason: SDUPTHER

## 2021-01-18 RX ORDER — ONDANSETRON 4 MG/1
4 TABLET, FILM COATED ORAL EVERY 12 HOURS PRN
Qty: 8 TABLET | Refills: 0 | Status: SHIPPED | OUTPATIENT
Start: 2021-01-18 | End: 2021-04-16 | Stop reason: SDUPTHER

## 2021-01-18 RX ORDER — FAMOTIDINE 20 MG/1
20 TABLET, FILM COATED ORAL 2 TIMES DAILY
Qty: 60 TABLET | Refills: 0 | Status: SHIPPED | OUTPATIENT
Start: 2021-01-18 | End: 2021-04-16 | Stop reason: SDUPTHER

## 2021-01-18 ASSESSMENT — ENCOUNTER SYMPTOMS
DIARRHEA: 0
ABDOMINAL PAIN: 0
COUGH: 0
CONSTIPATION: 0
CHEST TIGHTNESS: 0
SHORTNESS OF BREATH: 0
NAUSEA: 1
BLOOD IN STOOL: 0
VOMITING: 0

## 2021-01-18 NOTE — PROGRESS NOTES
SUBJECTIVE:  Jonah Standing   1955   female   Allergies   Allergen Reactions    Asa [Aspirin]     Atorvastatin Diarrhea    Cephalexin     Naproxen      Sleepy, GI upset       Chief Complaint   Patient presents with    Hyperlipidemia    Hypertension        Patient Active Problem List    Diagnosis Date Noted    Non-recurrent unilateral inguinal hernia without obstruction or gangrene     Hemorrhoid 04/28/2017    Iron deficiency anemia 04/28/2017    Essential hypertension 10/25/2016    MGUS (monoclonal gammopathy of unknown significance) 10/07/2016    Macrocytic anemia 10/07/2016    Insomnia 02/07/2016    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 ([address] 11/02/2015    Pure hypercholesterolemia 11/02/2015    Hypertriglyceridemia, familial 11/02/2015    Concussion with brief (less than one hour) loss of consciousness 09/01/2015    Concussion with < 1 hr loss of consciousness 09/01/2015    Fracture of multiple ribs 08/27/2015    Gastroduodenitis 07/01/2015    Heterozygous MTHFR mutation [id number] ([address] 12/16/2014    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[address] 12/16/2014    Vitamin D deficiency 12/16/2014     Overview:   Replaced inactive diagnosis term via diagnosis import      Allergic rhinitis 04/28/2014    Menopause 04/28/2014    Steatohepatitis     RA (rheumatoid arthritis) ([address]     Anxiety     Hyperlipidemia     Synovial cyst 05/14/2013    Arthritis of hand, degenerative 02/14/2013    Alopecia 11/27/2012    Loss of hair 11/27/2012    Dry mouth 11/13/2012    Rash 11/13/2012    Dry eyes 11/13/2012    Joint swelling 11/13/2012    Elevated rheumatoid factor 11/13/2012       HPI   Pt is here today for fu on hyperlipidmeia, HTN, depression/ anxiety, chronic insomnia, GERD, intermittent nausea, prediabetes, intermittent anemia and alcohol use disorder. She has been stable on current meds but has been experiencing more nausea and intermittent vomiting.  She has a hx of GERD and has been on Pepcid in the past but has not been taking it for a while. She has been drinking nightly and admits to drinking \" more than she should\" and 3-4 glasses of wine nightly. She did not tolerate Wellbutrin so she has stopped taking it. Doing well on Celexa. Sleeping well with Trazodone. Denies CP,SOB or myalgias. No ha,change in vision or neurologic sx. She has had hx of intermittent anemia . Has had a negative GI work up in the past. Last Hgb 10. 5. was advised to return for re check on labs but has not done that yet. No sx. Denies blood in stool or dark/ tarry stools. Past Medical History:   Diagnosis Date    Allergic     Anemia     Anxiety     Depression     Hyperlipidemia     Hypertension     [facility]) [facility] [facility]     Steatohepatitis      Social History     Socioeconomic History    Marital status:      Spouse name: Not on file    Number of children: Not on file    Years of education: Not on file    Highest education level: Not on file   Occupational History    Not on file   Social Needs    Financial resource strain: Not on file    Food insecurity     Worry: Not on file     Inability: Not on file    Transportation needs     Medical: Not on file     Non-medical: Not on file   Tobacco Use    Smoking status: Former Smoker     Packs/day: 0.25     Years: 5.00     Pack years: 1.25     Types: Cigarettes     Quit date:      Years since quittin.0    Smokeless tobacco: Never Used   Substance and Sexual Activity    Alcohol use:  Yes     Alcohol/week: 14.0 standard drinks     Types: 14 Glasses of wine per week    Drug use: No    Sexual activity: Not on file   Lifestyle    Physical activity     Days per week: Not on file     Minutes per session: Not on file    Stress: Not on file   Relationships    Social connections     Talks on phone: Not on file     Gets together: Not on file     Attends [employer] service: Not on file     Active member of club or organization: Not on file     Attends meetings of clubs or organizations: Not on file     Relationship status: Not on file    Intimate partner violence     Fear of current or ex partner: Not on file     Emotionally abused: Not on file     Physically abused: Not on file     Forced sexual activity: Not on file   Other Topics Concern    Not on file   Social History Narrative    Not on file     Family History   Problem Relation Age of Onset    Diabetes Sister     Cancer Mother         breast cancer       Review of Systems   Constitutional: Negative for activity change, appetite change, fatigue, fever and unexpected weight change. Respiratory: Negative for cough, chest tightness and shortness of breath. Cardiovascular: Negative for chest pain, palpitations and leg swelling. Gastrointestinal: Positive for nausea. Negative for abdominal pain, blood in stool, constipation, diarrhea and vomiting. Endocrine: Negative for cold intolerance and heat intolerance. Musculoskeletal: Negative for arthralgias and myalgias. Skin: Negative for rash. Neurological: Negative for light-headedness and headaches. Hematological: Negative for adenopathy. Does not bruise/bleed easily. Psychiatric/Behavioral: Negative for dysphoric mood, sleep disturbance and suicidal ideas. The patient is not nervous/anxious. OBJECTIVE:  /70 (Site: Right Upper Arm, Position: Sitting, Cuff Size: Medium Adult)   Pulse 108   Temp 96.3 °F (35.7 °C) (Temporal)   Resp 14   Wt 121 lb (54.9 kg)   SpO2 95%   BMI 22.86 kg/m²   Physical Exam  Vitals signs and nursing note reviewed. Constitutional:       Appearance: She is well-developed. Eyes:      Pupils: Pupils are equal, round, and reactive to light. Neck:      Musculoskeletal: Normal range of motion and neck supple. Thyroid: No thyromegaly. Vascular: No JVD. Cardiovascular:      Rate and Rhythm: Normal rate and regular rhythm.    Pulmonary:      Effort: Pulmonary effort is normal.      Breath sounds: Normal breath sounds. Abdominal:      General: Bowel sounds are normal.      Palpations: Abdomen is soft. Tenderness: There is abdominal tenderness. Comments: There is mild tenderness with palpation of mid epigastric area and upper abdomen   Skin:     Findings: No rash. Neurological:      Mental Status: She is alert and oriented to person, place, and time. Psychiatric:         Behavior: Behavior normal.         Thought Content: Thought content normal.         ASSESSMENT/PLAN:    1. Hyperlipidemia, unspecified hyperlipidemia type  Refill meds  Labs  appr diet mgt    2. Essential hypertension  Refill meds  Intermittent ambulatory BP checks    3. Insomnia, unspecified type  appr sleep hygiene  Refill Trazodone    4. Anxiety  Stress mgt  Refill Celexa    5. Depression, unspecified depression type  Stress mgt  Refill meds    6. Nausea  Zofran prn  Pt advised to stop or reduce alcohol use  Back on Pepcid  Re evaluate and GI fu if sx persist    7. Gastroesophageal reflux disease without esophagitis  GERD precautions  Back on Pepcid    8. Need for vaccination    - Zoster Subunit ([facility] [address])    9. Prediabetes  Labs  Discussed appr diet and increase physical activity      10.  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 (HCC)  No sx    11.anemia  Reviewed last labs  Re check CBC      Orders Placed This Encounter   Procedures    Zoster Subunit [facility])     Current Outpatient Medications   Medication Sig Dispense Refill    traZODone (DESYREL) 100 MG tablet 1 po qhs 30 tablet 2    rosuvastatin (CRESTOR) 20 MG tablet Take 1 tablet by mouth nightly 30 tablet 2    ondansetron (ZOFRAN) 4 MG tablet Take 1 tablet by mouth every 12 hours as needed for Nausea or Vomiting 8 tablet 0    losartan (COZAAR) 50 MG tablet Take 1 tablet by mouth daily 30 tablet 2    Icosapent Ethyl (VASCEPA) 1 g CAPS capsule TAKE 2 CAPSULES BY MOUTH TWICE DAILY 120 capsule 2    gemfibrozil (LOPID) 600 MG tablet Take 1 tablet by mouth 2 times daily (before meals) 60 tablet 2    citalopram (CELEXA) 40 MG tablet Take 1 tablet by mouth daily 30 tablet 2    famotidine (PEPCID) 20 MG tablet Take 1 tablet by mouth 2 times daily 60 tablet 0    buPROPion (WELLBUTRIN XL) 150 MG extended release tablet Take 1 tablet by mouth every morning 30 tablet 2    fluticasone (FLONASE) 50 MCG/ACT nasal spray 1 spray by Nasal route daily as needed for Rhinitis or Allergies 1 Bottle 2    methylfolate (DEPLIN) 7.5 MG TABS tablet Take 7.5 tablets by mouth daily 30 tablet 1    pantoprazole (PROTONIX) 20 MG tablet TAKE 1 TABLET BY MOUTH DAILY 30 tablet 2    PROAIR  (90 BASE) MCG/ACT inhaler INHALE 2 PUFFS INTO THE LUNGS EVERY 6 HOURS AS NEEDED FOR WHEEZING 8.5 g 0    Biotin 10 MG tablet Take 10 mg by mouth daily      Cholecalciferol (VITAMIN D) 2000 UNITS CAPS capsule Take by mouth      vitamin B-12 (CYANOCOBALAMIN) 100 MCG tablet Take 50 mcg by mouth daily      Pyridoxine HCl (VITAMIN B-6) 50 MG tablet Take 50 mg by mouth daily       No current facility-administered medications for this visit. Return in about 3 months (around 4/18/2021), or if symptoms worsen or fail to improve, for depression, GERD, hyperlipidemia.     Pooja Adams MD

## 2021-04-09 DIAGNOSIS — E78.5 HYPERLIPIDEMIA, UNSPECIFIED HYPERLIPIDEMIA TYPE: ICD-10-CM

## 2021-04-09 DIAGNOSIS — R73.03 PREDIABETES: ICD-10-CM

## 2021-04-09 DIAGNOSIS — D64.9 ANEMIA, UNSPECIFIED TYPE: ICD-10-CM

## 2021-04-09 LAB
A/G RATIO: 1.4 (ref 1.1–2.2)
ALBUMIN SERPL-MCNC: 4.5 G/DL (ref 3.4–5)
ALP BLD-CCNC: 107 U/L (ref 40–129)
ALT SERPL-CCNC: 22 U/L (ref 10–40)
ANION GAP SERPL CALCULATED.3IONS-SCNC: 11 MMOL/L (ref 3–16)
AST SERPL-CCNC: 25 U/L (ref 15–37)
BASOPHILS ABSOLUTE: 0 K/UL (ref 0–0.2)
BASOPHILS RELATIVE PERCENT: 0.8 %
BILIRUB SERPL-MCNC: 0.3 MG/DL (ref 0–1)
BUN BLDV-MCNC: 28 MG/DL (ref 7–20)
CALCIUM SERPL-MCNC: 9.8 MG/DL (ref 8.3–10.6)
CHLORIDE BLD-SCNC: 103 MMOL/L (ref 99–110)
CHOLESTEROL, TOTAL: 170 MG/DL (ref 0–199)
CO2: 22 MMOL/L (ref 21–32)
CREAT SERPL-MCNC: 1.2 MG/DL (ref 0.6–1.2)
EOSINOPHILS ABSOLUTE: 0.1 K/UL (ref 0–0.6)
EOSINOPHILS RELATIVE PERCENT: 1.2 %
GFR AFRICAN AMERICAN: 55
GFR NON-AFRICAN AMERICAN: 45
GLOBULIN: 3.2 G/DL
GLUCOSE BLD-MCNC: 118 MG/DL (ref 70–99)
HCT VFR BLD CALC: 31.7 % (ref 36–48)
HDLC SERPL-MCNC: 76 MG/DL (ref 40–60)
HEMOGLOBIN: 10.5 G/DL (ref 12–16)
LDL CHOLESTEROL CALCULATED: 58 MG/DL
LYMPHOCYTES ABSOLUTE: 1.9 K/UL (ref 1–5.1)
LYMPHOCYTES RELATIVE PERCENT: 37.4 %
MCH RBC QN AUTO: 34.7 PG (ref 26–34)
MCHC RBC AUTO-ENTMCNC: 33.1 G/DL (ref 31–36)
MCV RBC AUTO: 104.7 FL (ref 80–100)
MONOCYTES ABSOLUTE: 0.4 K/UL (ref 0–1.3)
MONOCYTES RELATIVE PERCENT: 7.8 %
NEUTROPHILS ABSOLUTE: 2.7 K/UL (ref 1.7–7.7)
NEUTROPHILS RELATIVE PERCENT: 52.8 %
PDW BLD-RTO: 13.6 % (ref 12.4–15.4)
PLATELET # BLD: 212 K/UL (ref 135–450)
PMV BLD AUTO: 10 FL (ref 5–10.5)
POTASSIUM SERPL-SCNC: 4.2 MMOL/L (ref 3.5–5.1)
RBC # BLD: 3.02 M/UL (ref 4–5.2)
SODIUM BLD-SCNC: 136 MMOL/L (ref 136–145)
TOTAL CK: 28 U/L (ref 26–192)
TOTAL PROTEIN: 7.7 G/DL (ref 6.4–8.2)
TRIGL SERPL-MCNC: 181 MG/DL (ref 0–150)
VLDLC SERPL CALC-MCNC: 36 MG/DL
WBC # BLD: 5.1 K/UL (ref 4–11)

## 2021-04-10 LAB
ESTIMATED AVERAGE GLUCOSE: 114 MG/DL
HBA1C MFR BLD: 5.6 %

## 2021-04-16 ENCOUNTER — OFFICE VISIT (OUTPATIENT)
Dept: FAMILY MEDICINE CLINIC | Age: 66
End: 2021-04-16
Payer: COMMERCIAL

## 2021-04-16 VITALS
TEMPERATURE: 96.6 F | BODY MASS INDEX: 23.41 KG/M2 | HEIGHT: 61 IN | WEIGHT: 124 LBS | SYSTOLIC BLOOD PRESSURE: 128 MMHG | HEART RATE: 95 BPM | RESPIRATION RATE: 16 BRPM | OXYGEN SATURATION: 98 % | DIASTOLIC BLOOD PRESSURE: 70 MMHG

## 2021-04-16 DIAGNOSIS — G47.00 INSOMNIA, UNSPECIFIED TYPE: ICD-10-CM

## 2021-04-16 DIAGNOSIS — R31.9 HEMATURIA, UNSPECIFIED TYPE: ICD-10-CM

## 2021-04-16 DIAGNOSIS — F32.A DEPRESSION, UNSPECIFIED DEPRESSION TYPE: ICD-10-CM

## 2021-04-16 DIAGNOSIS — R73.03 PREDIABETES: ICD-10-CM

## 2021-04-16 DIAGNOSIS — I10 ESSENTIAL HYPERTENSION: ICD-10-CM

## 2021-04-16 DIAGNOSIS — D64.9 ANEMIA, UNSPECIFIED TYPE: ICD-10-CM

## 2021-04-16 DIAGNOSIS — M05.731 RHEUMATOID ARTHRITIS INVOLVING RIGHT WRIST WITH POSITIVE RHEUMATOID FACTOR (HCC): ICD-10-CM

## 2021-04-16 DIAGNOSIS — E78.5 HYPERLIPIDEMIA, UNSPECIFIED HYPERLIPIDEMIA TYPE: Primary | ICD-10-CM

## 2021-04-16 DIAGNOSIS — K21.9 GASTROESOPHAGEAL REFLUX DISEASE WITHOUT ESOPHAGITIS: ICD-10-CM

## 2021-04-16 LAB
BILIRUBIN, POC: NORMAL
BLOOD URINE, POC: NORMAL
CLARITY, POC: CLEAR
COLOR, POC: YELLOW
GLUCOSE URINE, POC: NORMAL
KETONES, POC: NORMAL
LEUKOCYTE EST, POC: NORMAL
NITRITE, POC: NORMAL
PH, POC: 5.5
PROTEIN, POC: NORMAL
SPECIFIC GRAVITY, POC: 1.03
UROBILINOGEN, POC: 0.2

## 2021-04-16 PROCEDURE — 1036F TOBACCO NON-USER: CPT | Performed by: FAMILY MEDICINE

## 2021-04-16 PROCEDURE — G8420 CALC BMI NORM PARAMETERS: HCPCS | Performed by: FAMILY MEDICINE

## 2021-04-16 PROCEDURE — 81002 URINALYSIS NONAUTO W/O SCOPE: CPT | Performed by: FAMILY MEDICINE

## 2021-04-16 PROCEDURE — G8427 DOCREV CUR MEDS BY ELIG CLIN: HCPCS | Performed by: FAMILY MEDICINE

## 2021-04-16 PROCEDURE — 3017F COLORECTAL CA SCREEN DOC REV: CPT | Performed by: FAMILY MEDICINE

## 2021-04-16 PROCEDURE — 99214 OFFICE O/P EST MOD 30 MIN: CPT | Performed by: FAMILY MEDICINE

## 2021-04-16 RX ORDER — GEMFIBROZIL 600 MG/1
600 TABLET, FILM COATED ORAL
Qty: 60 TABLET | Refills: 2 | Status: SHIPPED | OUTPATIENT
Start: 2021-04-16 | End: 2021-07-16 | Stop reason: SDUPTHER

## 2021-04-16 RX ORDER — ICOSAPENT ETHYL 1000 MG/1
CAPSULE ORAL
Qty: 120 CAPSULE | Refills: 2 | Status: SHIPPED | OUTPATIENT
Start: 2021-04-16 | End: 2021-07-16 | Stop reason: SDUPTHER

## 2021-04-16 RX ORDER — TRAZODONE HYDROCHLORIDE 100 MG/1
TABLET ORAL
Qty: 30 TABLET | Refills: 2 | Status: SHIPPED | OUTPATIENT
Start: 2021-04-16 | End: 2021-07-16 | Stop reason: SDUPTHER

## 2021-04-16 RX ORDER — LOSARTAN POTASSIUM 50 MG/1
50 TABLET ORAL DAILY
Qty: 30 TABLET | Refills: 2 | Status: SHIPPED | OUTPATIENT
Start: 2021-04-16 | End: 2021-07-16 | Stop reason: SDUPTHER

## 2021-04-16 RX ORDER — BUPROPION HYDROCHLORIDE 150 MG/1
150 TABLET ORAL EVERY MORNING
Qty: 30 TABLET | Refills: 2 | Status: SHIPPED | OUTPATIENT
Start: 2021-04-16 | End: 2021-07-16

## 2021-04-16 RX ORDER — CITALOPRAM 40 MG/1
40 TABLET ORAL DAILY
Qty: 30 TABLET | Refills: 2 | Status: SHIPPED | OUTPATIENT
Start: 2021-04-16 | End: 2021-07-16 | Stop reason: SDUPTHER

## 2021-04-16 RX ORDER — FAMOTIDINE 20 MG/1
20 TABLET, FILM COATED ORAL DAILY
Qty: 30 TABLET | Refills: 2 | Status: SHIPPED | OUTPATIENT
Start: 2021-04-16 | End: 2021-07-16 | Stop reason: SDUPTHER

## 2021-04-16 RX ORDER — ONDANSETRON 4 MG/1
4 TABLET, FILM COATED ORAL EVERY 12 HOURS PRN
Qty: 8 TABLET | Refills: 0 | Status: SHIPPED | OUTPATIENT
Start: 2021-04-16 | End: 2021-07-16 | Stop reason: SDUPTHER

## 2021-04-16 RX ORDER — FOLIC ACID 1 MG/1
1 TABLET ORAL DAILY
Qty: 30 TABLET | Refills: 2 | Status: SHIPPED | OUTPATIENT
Start: 2021-04-16 | End: 2021-07-16 | Stop reason: SDUPTHER

## 2021-04-16 RX ORDER — ROSUVASTATIN CALCIUM 20 MG/1
20 TABLET, COATED ORAL NIGHTLY
Qty: 30 TABLET | Refills: 2 | Status: SHIPPED | OUTPATIENT
Start: 2021-04-16 | End: 2021-07-16 | Stop reason: SDUPTHER

## 2021-04-16 ASSESSMENT — ENCOUNTER SYMPTOMS
CONSTIPATION: 0
BLOOD IN STOOL: 0
BACK PAIN: 1
SHORTNESS OF BREATH: 0
CHEST TIGHTNESS: 0
DIARRHEA: 0
COUGH: 0
ABDOMINAL PAIN: 0

## 2021-04-16 NOTE — PROGRESS NOTES
SUBJECTIVE:  Carissa Eng   1955   female   Allergies   Allergen Reactions    Asa [Aspirin]     Atorvastatin Diarrhea    Cephalexin     Naproxen      Sleepy, GI upset       Chief Complaint   Patient presents with    Depression     GERD    Hyperlipidemia        Patient Active Problem List    Diagnosis Date Noted    Non-recurrent unilateral inguinal hernia without obstruction or gangrene     Hemorrhoid 04/28/2017    Iron deficiency anemia 04/28/2017    Essential hypertension 10/25/2016    MGUS (monoclonal gammopathy of unknown significance) 10/07/2016    Macrocytic anemia 10/07/2016    Insomnia 02/07/2016    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 ([address] 11/02/2015    Pure hypercholesterolemia 11/02/2015    Hypertriglyceridemia, familial 11/02/2015    Concussion with brief (less than one hour) loss of consciousness 09/01/2015    Concussion with < 1 hr loss of consciousness 09/01/2015    Fracture of multiple ribs 08/27/2015    Gastroduodenitis 07/01/2015    Heterozygous MTHFR mutation [id number] ([address] 12/16/2014    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[address] 12/16/2014    Vitamin D deficiency 12/16/2014     Overview:   Replaced inactive diagnosis term via diagnosis import      Allergic rhinitis 04/28/2014    Menopause 04/28/2014    Steatohepatitis     RA (rheumatoid arthritis) ([address]     Anxiety     Hyperlipidemia     Synovial cyst 05/14/2013    Arthritis of hand, degenerative 02/14/2013    Alopecia 11/27/2012    Loss of hair 11/27/2012    Dry mouth 11/13/2012    Rash 11/13/2012    Dry eyes 11/13/2012    Joint swelling 11/13/2012    Elevated rheumatoid factor 11/13/2012       HPI   Patient is here today for follow-up on hyperlipidemia, depression/anxiety, hypertension, GERD, RA, prediabetes, chronic insomnia, anemia and complaining of left lower back pain. Patient reports overall she has been stable on current treatment management.   Denies chest pain, shortness of breath or myalgias. Denies headache change in vision or any neurologic symptoms. Denies symptoms of depression or anxiety. She has been sleeping well on current management. She has been taking Pepcid 20 mg just once a day which has been helping with GERD symptoms. Reports she is not had any issues except if she eats something that is not healthy or spicy then she has some heartburn and some nausea. Requesting a few Zofran just in case that does happen again. She does continue to drink alcohol regularly. Her LFTs have been okay but she continues to have stable mild anemia. She has been evaluated by GI several times for anemia. She is also seen hematology about 3 years ago she has seen Dr. Glenn English. Patient reports she was told they cannot really find a source for her anemia. She has been taking B12 and over-the-counter folic acid. Patient has history of RA but she is not currently on any medications has not had any trouble with any symptoms/arthralgias. She has noticed some mild left-sided low back pain intermittently for the last week. Denies any injuries, no lower extremity symptoms, no bowel or bladder complaints. No urinary symptoms. No abdominal pain, nausea or vomiting or change in appetite.   Past Medical History:   Diagnosis Date    Allergic     Anemia     Anxiety     Depression     Hyperlipidemia     Hypertension     [facility] [address] [address] [address] [facility]     Steatohepatitis      Social History     Socioeconomic History    Marital status:      Spouse name: Not on file    Number of children: Not on file    Years of education: Not on file    Highest education level: Not on file   Occupational History    Not on file   Social Needs    Financial resource strain: Not on file    Food insecurity     Worry: Not on file     Inability: Not on file    Transportation needs     Medical: Not on file     Non-medical: Not on file   Tobacco Use    Smoking status: Former Smoker Cuff Size: Medium Adult)   Pulse 95   Temp 96.6 °F (35.9 °C) (Temporal)   Resp 16   Ht 5' 1\" (1.549 m)   Wt 124 lb (56.2 kg)   SpO2 98%   BMI 23.43 kg/m²   Physical Exam  Vitals signs and nursing note reviewed. Constitutional:       Appearance: She is well-developed. Eyes:      Pupils: Pupils are equal, round, and reactive to light. Neck:      Musculoskeletal: Normal range of motion and neck supple. Thyroid: No thyromegaly. Vascular: No JVD. Cardiovascular:      Rate and Rhythm: Normal rate and regular rhythm. Pulmonary:      Effort: Pulmonary effort is normal.      Breath sounds: Normal breath sounds. Abdominal:      General: Bowel sounds are normal. There is no distension. Palpations: Abdomen is soft. Tenderness: There is no abdominal tenderness. There is no guarding. Musculoskeletal:      Comments: Back-no spinal tenderness with palpation she has normal range of motion. There is mild tenderness with palpation of left lower posterior ribs but no gross abnormalities or skin changes. Skin:     Findings: No rash. Neurological:      Mental Status: She is alert and oriented to person, place, and time. Psychiatric:         Behavior: Behavior normal.         Thought Content: Thought content normal.     UA-hematuria otherwise normal    ASSESSMENT/PLAN:    1. Hyperlipidemia, unspecified hyperlipidemia type  We will continue with current treatment and refill medications reviewed recent labs      2. Depression, unspecified depression type  Continue with Celexa and Wellbutrin  Stress management    3. Essential hypertension  Refill meds, ambulatory blood pressure checks  DASH diet reminder    4. Gastroesophageal reflux disease without esophagitis  GERD precautions  We will continue with Pepcid 20 mg daily  Zofran occasionally as needed    5. 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 [address]  Patient has no symptoms at this time    6. Prediabetes  Reviewed recent labs. Discussed appropriate diet management. We will continue to follow  - POCT Urinalysis no Micro    7. Insomnia, unspecified type  Appropriate sleep hygiene. Continue trazodone    8. Hematuria, unspecified type  UA for culture  - Culture, Urine    9. Anemia, unspecified type  Reviewed recent labs and current history. Will follow up with  information was given to patient. 10.  Alcohol use syndrome  Patient was advised to stop drinking alcohol  Folic acid added    Mammogram advised again  Orders Placed This Encounter   Procedures    Culture, Urine     Order Specific Question:   Specify (ex-cath, midstream, cysto, etc)?      Answer:   mid stream    POCT Urinalysis no Micro     Current Outpatient Medications   Medication Sig Dispense Refill    traZODone (DESYREL) 100 MG tablet 1 po qhs 30 tablet 2    rosuvastatin (CRESTOR) 20 MG tablet Take 1 tablet by mouth nightly 30 tablet 2    ondansetron (ZOFRAN) 4 MG tablet Take 1 tablet by mouth every 12 hours as needed for Nausea or Vomiting 8 tablet 0    losartan (COZAAR) 50 MG tablet Take 1 tablet by mouth daily 30 tablet 2    Icosapent Ethyl (VASCEPA) 1 g CAPS capsule TAKE 2 CAPSULES BY MOUTH TWICE DAILY 120 capsule 2    gemfibrozil (LOPID) 600 MG tablet Take 1 tablet by mouth 2 times daily (before meals) 60 tablet 2    famotidine (PEPCID) 20 MG tablet Take 1 tablet by mouth daily 30 tablet 2    citalopram (CELEXA) 40 MG tablet Take 1 tablet by mouth daily 30 tablet 2    buPROPion (WELLBUTRIN XL) 150 MG extended release tablet Take 1 tablet by mouth every morning 30 tablet 2    folic acid (FOLVITE) 1 MG tablet Take 1 tablet by mouth daily 30 tablet 2    fluticasone (FLONASE) 50 MCG/ACT nasal spray 1 spray by Nasal route daily as needed for Rhinitis or Allergies 1 Bottle 2    methylfolate (DEPLIN) 7.5 MG TABS tablet Take 7.5 tablets by mouth daily 30 tablet 1    pantoprazole (PROTONIX) 20 MG tablet TAKE 1 TABLET BY MOUTH DAILY 30 tablet 2    PROAIR  (90 BASE) MCG/ACT inhaler INHALE 2 PUFFS INTO THE LUNGS EVERY 6 HOURS AS NEEDED FOR WHEEZING 8.5 g 0    Biotin 10 MG tablet Take 10 mg by mouth daily      Cholecalciferol (VITAMIN D) 2000 UNITS CAPS capsule Take by mouth      vitamin B-12 (CYANOCOBALAMIN) 100 MCG tablet Take 50 mcg by mouth daily      Pyridoxine HCl (VITAMIN B-6) 50 MG tablet Take 50 mg by mouth daily       No current facility-administered medications for this visit. Return in about 3 months (around 7/16/2021), or if symptoms worsen or fail to improve, for HTN, hyperlipidmeia, anemia.     Naomy Farris MD

## 2021-04-18 LAB — URINE CULTURE, ROUTINE: NORMAL

## 2021-04-19 DIAGNOSIS — R31.9 HEMATURIA, UNSPECIFIED TYPE: Primary | ICD-10-CM

## 2021-04-19 NOTE — RESULT ENCOUNTER NOTE
Patient calling back for urine culture results. Advised patient per Dr. Casillas General urine culture was negative for infection. We need to recheck urine in 2-3 weeks. Patient stated that pain went away yesterday. She is wondering if this could have been due to her receiving the second covid vaccine. She states her body went wacky. She had arm pain, tired for two days and aching, and pain back near ribs. Patient will come back in office for repeat u/a next Friday. Does provider think the covidvaccine could do this?

## 2021-07-16 ENCOUNTER — OFFICE VISIT (OUTPATIENT)
Dept: FAMILY MEDICINE CLINIC | Age: 66
End: 2021-07-16
Payer: COMMERCIAL

## 2021-07-16 VITALS
RESPIRATION RATE: 18 BRPM | WEIGHT: 126 LBS | SYSTOLIC BLOOD PRESSURE: 120 MMHG | HEART RATE: 105 BPM | DIASTOLIC BLOOD PRESSURE: 70 MMHG | OXYGEN SATURATION: 96 % | TEMPERATURE: 97 F | BODY MASS INDEX: 23.79 KG/M2 | HEIGHT: 61 IN

## 2021-07-16 DIAGNOSIS — E78.5 HYPERLIPIDEMIA, UNSPECIFIED HYPERLIPIDEMIA TYPE: Primary | ICD-10-CM

## 2021-07-16 DIAGNOSIS — I10 ESSENTIAL HYPERTENSION: ICD-10-CM

## 2021-07-16 DIAGNOSIS — Z23 NEED FOR VACCINATION: ICD-10-CM

## 2021-07-16 DIAGNOSIS — F32.A DEPRESSION, UNSPECIFIED DEPRESSION TYPE: ICD-10-CM

## 2021-07-16 DIAGNOSIS — F41.9 ANXIETY: ICD-10-CM

## 2021-07-16 DIAGNOSIS — G47.00 INSOMNIA, UNSPECIFIED TYPE: ICD-10-CM

## 2021-07-16 DIAGNOSIS — R73.03 PREDIABETES: ICD-10-CM

## 2021-07-16 DIAGNOSIS — K21.9 GASTROESOPHAGEAL REFLUX DISEASE WITHOUT ESOPHAGITIS: ICD-10-CM

## 2021-07-16 DIAGNOSIS — J30.9 ALLERGIC RHINITIS, UNSPECIFIED SEASONALITY, UNSPECIFIED TRIGGER: ICD-10-CM

## 2021-07-16 PROCEDURE — G8420 CALC BMI NORM PARAMETERS: HCPCS | Performed by: FAMILY MEDICINE

## 2021-07-16 PROCEDURE — 99214 OFFICE O/P EST MOD 30 MIN: CPT | Performed by: FAMILY MEDICINE

## 2021-07-16 PROCEDURE — 90471 IMMUNIZATION ADMIN: CPT | Performed by: FAMILY MEDICINE

## 2021-07-16 PROCEDURE — 1036F TOBACCO NON-USER: CPT | Performed by: FAMILY MEDICINE

## 2021-07-16 PROCEDURE — G8427 DOCREV CUR MEDS BY ELIG CLIN: HCPCS | Performed by: FAMILY MEDICINE

## 2021-07-16 PROCEDURE — 3017F COLORECTAL CA SCREEN DOC REV: CPT | Performed by: FAMILY MEDICINE

## 2021-07-16 PROCEDURE — 90750 HZV VACC RECOMBINANT IM: CPT | Performed by: FAMILY MEDICINE

## 2021-07-16 RX ORDER — BUPROPION HYDROCHLORIDE 150 MG/1
150 TABLET ORAL EVERY MORNING
Qty: 30 TABLET | Refills: 2 | Status: CANCELLED | OUTPATIENT
Start: 2021-07-16

## 2021-07-16 RX ORDER — FOLIC ACID 1 MG/1
1 TABLET ORAL DAILY
Qty: 30 TABLET | Refills: 2 | Status: SHIPPED | OUTPATIENT
Start: 2021-07-16 | End: 2021-10-15 | Stop reason: SDUPTHER

## 2021-07-16 RX ORDER — GEMFIBROZIL 600 MG/1
600 TABLET, FILM COATED ORAL
Qty: 60 TABLET | Refills: 2 | Status: SHIPPED | OUTPATIENT
Start: 2021-07-16 | End: 2021-10-15 | Stop reason: SDUPTHER

## 2021-07-16 RX ORDER — ONDANSETRON 4 MG/1
4 TABLET, FILM COATED ORAL EVERY 12 HOURS PRN
Qty: 8 TABLET | Refills: 0 | Status: SHIPPED | OUTPATIENT
Start: 2021-07-16

## 2021-07-16 RX ORDER — TRAZODONE HYDROCHLORIDE 100 MG/1
TABLET ORAL
Qty: 30 TABLET | Refills: 2 | Status: SHIPPED | OUTPATIENT
Start: 2021-07-16 | End: 2021-10-15 | Stop reason: SDUPTHER

## 2021-07-16 RX ORDER — FAMOTIDINE 20 MG/1
20 TABLET, FILM COATED ORAL DAILY
Qty: 30 TABLET | Refills: 2 | Status: SHIPPED | OUTPATIENT
Start: 2021-07-16 | End: 2021-10-15 | Stop reason: SDUPTHER

## 2021-07-16 RX ORDER — CITALOPRAM 40 MG/1
40 TABLET ORAL DAILY
Qty: 30 TABLET | Refills: 2 | Status: SHIPPED | OUTPATIENT
Start: 2021-07-16 | End: 2021-10-15 | Stop reason: SDUPTHER

## 2021-07-16 RX ORDER — ROSUVASTATIN CALCIUM 20 MG/1
20 TABLET, COATED ORAL NIGHTLY
Qty: 30 TABLET | Refills: 2 | Status: SHIPPED | OUTPATIENT
Start: 2021-07-16 | End: 2021-10-15 | Stop reason: SDUPTHER

## 2021-07-16 RX ORDER — ICOSAPENT ETHYL 1000 MG/1
CAPSULE ORAL
Qty: 120 CAPSULE | Refills: 2 | Status: SHIPPED | OUTPATIENT
Start: 2021-07-16 | End: 2021-10-15 | Stop reason: SDUPTHER

## 2021-07-16 RX ORDER — LOSARTAN POTASSIUM 50 MG/1
50 TABLET ORAL DAILY
Qty: 30 TABLET | Refills: 2 | Status: SHIPPED | OUTPATIENT
Start: 2021-07-16 | End: 2021-10-15 | Stop reason: SDUPTHER

## 2021-07-16 ASSESSMENT — ENCOUNTER SYMPTOMS
SHORTNESS OF BREATH: 0
COUGH: 0
RHINORRHEA: 1
BLOOD IN STOOL: 0
CHEST TIGHTNESS: 0
DIARRHEA: 0
ABDOMINAL PAIN: 0
CONSTIPATION: 0

## 2021-07-16 ASSESSMENT — PATIENT HEALTH QUESTIONNAIRE - PHQ9
SUM OF ALL RESPONSES TO PHQ QUESTIONS 1-9: 0
SUM OF ALL RESPONSES TO PHQ9 QUESTIONS 1 & 2: 0
SUM OF ALL RESPONSES TO PHQ QUESTIONS 1-9: 0
SUM OF ALL RESPONSES TO PHQ QUESTIONS 1-9: 0
1. LITTLE INTEREST OR PLEASURE IN DOING THINGS: 0
2. FEELING DOWN, DEPRESSED OR HOPELESS: 0

## 2021-07-16 NOTE — PROGRESS NOTES
SUBJECTIVE:  Albertina Song   1955   female   Allergies   Allergen Reactions    Asa [Aspirin]     Atorvastatin Diarrhea    Cephalexin     Naproxen      Sleepy, GI upset       Chief Complaint   Patient presents with    3 Month Follow-Up    Hyperlipidemia        Patient Active Problem List    Diagnosis Date Noted    Non-recurrent unilateral inguinal hernia without obstruction or gangrene     Hemorrhoid 04/28/2017    Iron deficiency anemia 04/28/2017    Essential hypertension 10/25/2016    MGUS (monoclonal gammopathy of unknown significance) 10/07/2016    Macrocytic anemia 10/07/2016    Insomnia 02/07/2016    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 ([facility] 11/02/2015    Pure hypercholesterolemia 11/02/2015    Hypertriglyceridemia, familial 11/02/2015    Concussion with brief (less than one hour) loss of consciousness 09/01/2015    Concussion with < 1 hr loss of consciousness 09/01/2015    Fracture of multiple ribs 08/27/2015    Gastroduodenitis 07/01/2015    Heterozygous MTHFR mutation [id number] 12/16/2014    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    Vitamin D deficiency 12/16/2014     Overview:   Replaced inactive diagnosis term via diagnosis import      Allergic rhinitis 04/28/2014    Menopause 04/28/2014    Steatohepatitis     RA (rheumatoid arthritis) ([facility]     Anxiety     Hyperlipidemia     Synovial cyst 05/14/2013    Arthritis of hand, degenerative 02/14/2013    Alopecia 11/27/2012    Loss of hair 11/27/2012    Dry mouth 11/13/2012    Rash 11/13/2012    Dry eyes 11/13/2012    Joint swelling 11/13/2012    Elevated rheumatoid factor 11/13/2012       HPI   Patient is here today for follow-up on hyperlipidemia, depression, chronic insomnia, hypertension, GERD, prediabetes, and allergic rhinitis.   Patient reports she is having more trouble with seasonal allergy symptoms recently with having to clear her throat, itchy scratchy throat and having clear rhinorrhea every morning. She has not been taking any medications for this yet but she does have some Flonase at home which she is planning to try. She is been doing well on her other medications otherwise. Patient denies symptoms of depression or anxiety. Has been sleeping well with the current dose of trazodone. No GI/GERD symptoms on Pepcid. She does continue to use Zofran occasionally after eating. She was advised to see GI for this but she has not had a chance to be there yet. Reports she is had a lot of stuff going on in her personal life and she has not been able to get to the gastroenterologist.  As she is also not had a mammogram done yet since . She is also not had a Pap smear. Patient has a history of prediabetes last hemoglobin A1c was okay at 5.6. Cholesterol labs were okay in April. Total cholesterol 170 with triglycerides slightly high at 181 HDL 76 and LDL 58. Patient denies chest pain, shortness of breath or orthopnea. Denies headache change in vision or neurologic symptoms. She does need her second shingles vaccine today. She did well with the first vaccine. She is at her Covid vaccinations and completed them back in April. Past Medical History:   Diagnosis Date    Allergic     Anemia     Anxiety     Depression     Hyperlipidemia     Hypertension     [facility]) [facility]     Steatohepatitis      Social History     Socioeconomic History    Marital status:      Spouse name: Not on file    Number of children: Not on file    Years of education: Not on file    Highest education level: Not on file   Occupational History    Not on file   Tobacco Use    Smoking status: Former Smoker     Packs/day: 0.25     Years: 5.00     Pack years: 1.25     Types: Cigarettes     Quit date:      Years since quittin.5    Smokeless tobacco: Never Used   Vaping Use    Vaping Use: Never used   Substance and Sexual Activity    Alcohol use:  Yes mammogram.  Orders are in her chart  Patient advised to have GYN exam/Pap smear  No orders of the defined types were placed in this encounter. Current Outpatient Medications   Medication Sig Dispense Refill    traZODone (DESYREL) 100 MG tablet 1 po qhs 30 tablet 2    rosuvastatin (CRESTOR) 20 MG tablet Take 1 tablet by mouth nightly 30 tablet 2    losartan (COZAAR) 50 MG tablet Take 1 tablet by mouth daily 30 tablet 2    ondansetron (ZOFRAN) 4 MG tablet Take 1 tablet by mouth every 12 hours as needed for Nausea or Vomiting 8 tablet 0    Icosapent Ethyl (VASCEPA) 1 g CAPS capsule TAKE 2 CAPSULES BY MOUTH TWICE DAILY 120 capsule 2    gemfibrozil (LOPID) 600 MG tablet Take 1 tablet by mouth 2 times daily (before meals) 60 tablet 2    famotidine (PEPCID) 20 MG tablet Take 1 tablet by mouth daily 30 tablet 2    citalopram (CELEXA) 40 MG tablet Take 1 tablet by mouth daily 30 tablet 2    folic acid (FOLVITE) 1 MG tablet Take 1 tablet by mouth daily 30 tablet 2    fluticasone (FLONASE) 50 MCG/ACT nasal spray 1 spray by Nasal route daily as needed for Rhinitis or Allergies 1 Bottle 2    methylfolate (DEPLIN) 7.5 MG TABS tablet Take 7.5 tablets by mouth daily 30 tablet 1    pantoprazole (PROTONIX) 20 MG tablet TAKE 1 TABLET BY MOUTH DAILY 30 tablet 2    PROAIR  (90 BASE) MCG/ACT inhaler INHALE 2 PUFFS INTO THE LUNGS EVERY 6 HOURS AS NEEDED FOR WHEEZING 8.5 g 0    Biotin 10 MG tablet Take 10 mg by mouth daily      Cholecalciferol (VITAMIN D) 2000 UNITS CAPS capsule Take by mouth      vitamin B-12 (CYANOCOBALAMIN) 100 MCG tablet Take 50 mcg by mouth daily      Pyridoxine HCl (VITAMIN B-6) 50 MG tablet Take 50 mg by mouth daily       No current facility-administered medications for this visit. Return in about 3 months (around 10/16/2021), or if symptoms worsen or fail to improve, for depression, hyperlipidemia.     Natalie Cain MD, MD

## 2021-09-08 ENCOUNTER — TELEPHONE (OUTPATIENT)
Dept: FAMILY MEDICINE CLINIC | Age: 66
End: 2021-09-08

## 2021-09-08 NOTE — TELEPHONE ENCOUNTER
----- Message from Chalino Ovalles sent at 9/8/2021  1:30 PM EDT -----  Subject: Message to Provider    QUESTIONS  Information for Provider? Was wondering if Dr. Cj Gunderson would be willing to   take on daughters as new patients, ages 09/21/1985 and 12/03/1987,   [phone], OK to leave message on voicemail  ---------------------------------------------------------------------------  --------------  [address]  What is the best way for the office to contact you? OK to leave message on   voicemail  Preferred Call Back Phone Number? [phone]  ---------------------------------------------------------------------------  --------------  SCRIPT ANSWERS  Relationship to Patient?  Self

## 2021-10-15 ENCOUNTER — OFFICE VISIT (OUTPATIENT)
Dept: FAMILY MEDICINE CLINIC | Age: 66
End: 2021-10-15
Payer: COMMERCIAL

## 2021-10-15 VITALS
SYSTOLIC BLOOD PRESSURE: 118 MMHG | OXYGEN SATURATION: 98 % | HEIGHT: 61 IN | RESPIRATION RATE: 18 BRPM | WEIGHT: 126.2 LBS | DIASTOLIC BLOOD PRESSURE: 70 MMHG | TEMPERATURE: 96.6 F | HEART RATE: 90 BPM | BODY MASS INDEX: 23.83 KG/M2

## 2021-10-15 DIAGNOSIS — F32.A DEPRESSION, UNSPECIFIED DEPRESSION TYPE: ICD-10-CM

## 2021-10-15 DIAGNOSIS — M05.731 RHEUMATOID ARTHRITIS INVOLVING RIGHT WRIST WITH POSITIVE RHEUMATOID FACTOR (HCC): ICD-10-CM

## 2021-10-15 DIAGNOSIS — R73.03 PREDIABETES: ICD-10-CM

## 2021-10-15 DIAGNOSIS — K21.9 GASTROESOPHAGEAL REFLUX DISEASE WITHOUT ESOPHAGITIS: ICD-10-CM

## 2021-10-15 DIAGNOSIS — Z23 NEED FOR VACCINATION: ICD-10-CM

## 2021-10-15 DIAGNOSIS — G47.00 INSOMNIA, UNSPECIFIED TYPE: ICD-10-CM

## 2021-10-15 DIAGNOSIS — I10 ESSENTIAL HYPERTENSION: ICD-10-CM

## 2021-10-15 DIAGNOSIS — F41.9 ANXIETY: ICD-10-CM

## 2021-10-15 DIAGNOSIS — E78.5 HYPERLIPIDEMIA, UNSPECIFIED HYPERLIPIDEMIA TYPE: Primary | ICD-10-CM

## 2021-10-15 DIAGNOSIS — J30.9 ALLERGIC RHINITIS, UNSPECIFIED SEASONALITY, UNSPECIFIED TRIGGER: ICD-10-CM

## 2021-10-15 PROCEDURE — G8420 CALC BMI NORM PARAMETERS: HCPCS | Performed by: FAMILY MEDICINE

## 2021-10-15 PROCEDURE — 99214 OFFICE O/P EST MOD 30 MIN: CPT | Performed by: FAMILY MEDICINE

## 2021-10-15 PROCEDURE — 90471 IMMUNIZATION ADMIN: CPT | Performed by: FAMILY MEDICINE

## 2021-10-15 PROCEDURE — 3017F COLORECTAL CA SCREEN DOC REV: CPT | Performed by: FAMILY MEDICINE

## 2021-10-15 PROCEDURE — 90674 CCIIV4 VAC NO PRSV 0.5 ML IM: CPT | Performed by: FAMILY MEDICINE

## 2021-10-15 PROCEDURE — 1036F TOBACCO NON-USER: CPT | Performed by: FAMILY MEDICINE

## 2021-10-15 PROCEDURE — G8427 DOCREV CUR MEDS BY ELIG CLIN: HCPCS | Performed by: FAMILY MEDICINE

## 2021-10-15 PROCEDURE — G8482 FLU IMMUNIZE ORDER/ADMIN: HCPCS | Performed by: FAMILY MEDICINE

## 2021-10-15 RX ORDER — FOLIC ACID 1 MG/1
1 TABLET ORAL DAILY
Qty: 30 TABLET | Refills: 2 | Status: SHIPPED | OUTPATIENT
Start: 2021-10-15 | End: 2022-01-11 | Stop reason: SDUPTHER

## 2021-10-15 RX ORDER — FAMOTIDINE 20 MG/1
20 TABLET, FILM COATED ORAL DAILY
Qty: 30 TABLET | Refills: 2 | Status: SHIPPED | OUTPATIENT
Start: 2021-10-15 | End: 2022-01-11 | Stop reason: SDUPTHER

## 2021-10-15 RX ORDER — TRAZODONE HYDROCHLORIDE 100 MG/1
TABLET ORAL
Qty: 30 TABLET | Refills: 2 | Status: SHIPPED | OUTPATIENT
Start: 2021-10-15 | End: 2022-01-11 | Stop reason: SDUPTHER

## 2021-10-15 RX ORDER — LOSARTAN POTASSIUM 50 MG/1
50 TABLET ORAL DAILY
Qty: 30 TABLET | Refills: 2 | Status: SHIPPED | OUTPATIENT
Start: 2021-10-15 | End: 2022-01-11 | Stop reason: SDUPTHER

## 2021-10-15 RX ORDER — ROSUVASTATIN CALCIUM 20 MG/1
20 TABLET, COATED ORAL NIGHTLY
Qty: 30 TABLET | Refills: 2 | Status: SHIPPED | OUTPATIENT
Start: 2021-10-15 | End: 2022-01-11 | Stop reason: SDUPTHER

## 2021-10-15 RX ORDER — ICOSAPENT ETHYL 1000 MG/1
CAPSULE ORAL
Qty: 120 CAPSULE | Refills: 2 | Status: SHIPPED | OUTPATIENT
Start: 2021-10-15 | End: 2022-01-11 | Stop reason: SDUPTHER

## 2021-10-15 RX ORDER — CITALOPRAM 40 MG/1
40 TABLET ORAL DAILY
Qty: 30 TABLET | Refills: 2 | Status: SHIPPED | OUTPATIENT
Start: 2021-10-15 | End: 2022-01-11 | Stop reason: SDUPTHER

## 2021-10-15 RX ORDER — GEMFIBROZIL 600 MG/1
600 TABLET, FILM COATED ORAL
Qty: 60 TABLET | Refills: 2 | Status: SHIPPED | OUTPATIENT
Start: 2021-10-15 | End: 2022-01-11 | Stop reason: SDUPTHER

## 2021-10-15 ASSESSMENT — ENCOUNTER SYMPTOMS
BLOOD IN STOOL: 0
COUGH: 0
SHORTNESS OF BREATH: 0
ABDOMINAL PAIN: 0
CHEST TIGHTNESS: 0

## 2021-10-15 NOTE — PROGRESS NOTES
followed up with a rheumatologist for a while. She has been doing well on current treatment management. Patient denies chest pain, shortness of breath or orthopnea. Denies any change in vision or neurologic symptoms. No myalgias. No GI/GERD symptoms on Pepcid. Patient denies symptoms of depression or anxiety. He is she has been sleeping well. She is under some increased stress with her sister and mother moved in with her since her sister has been sick. She has been trying to take care of them. .  Last hemoglobin A1c was okay at 5.6. Last cholesterol labs were good with total cholesterol 170 and LDL of 58 slightly elevated triglycerides of 181. Patient has not scheduled a mammogram yet. Last mammogram on file is from . Past Medical History:   Diagnosis Date    Allergic     Anemia     Anxiety     Depression     Hyperlipidemia     Hypertension     [facility]) [facility]     Steatohepatitis      Social History     Socioeconomic History    Marital status:      Spouse name: Not on file    Number of children: Not on file    Years of education: Not on file    Highest education level: Not on file   Occupational History    Not on file   Tobacco Use    Smoking status: Former Smoker     Packs/day: 0.25     Years: 5.00     Pack years: 1.25     Types: Cigarettes     Quit date:      Years since quittin.7    Smokeless tobacco: Never Used   Vaping Use    Vaping Use: Never used   Substance and Sexual Activity    Alcohol use:  Yes     Alcohol/week: 14.0 standard drinks     Types: 14 Glasses of wine per week    Drug use: No    Sexual activity: Not on file   Other Topics Concern    Not on file   Social History Narrative    Not on file     Social Determinants of Health     Financial Resource Strain:     Difficulty of Paying Living Expenses:    Food Insecurity:     Worried About Running Out of Food in the Last Year:     [address] in the Last Year:    Transportation Needs:     Lack of Transportation (Medical):  Lack of Transportation (Non-Medical):    Physical Activity:     Days of Exercise per Week:     Minutes of Exercise per Session:    Stress:     Feeling of Stress :    Social Connections:     Frequency of Communication with Friends and Family:     Frequency of Social Gatherings with Friends and Family:     Attends [employer] Services:     Active Member of Clubs or Organizations:     Attends Club or Organization Meetings:     Marital Status:    Intimate Partner Violence:     Fear of Current or Ex-Partner:     Emotionally Abused:     Physically Abused:     Sexually Abused:      Family History   Problem Relation Age of Onset    Diabetes Sister     Cancer Mother         breast cancer       Review of Systems   Constitutional: Negative for activity change, appetite change and unexpected weight change. Respiratory: Negative for cough, chest tightness and shortness of breath. Cardiovascular: Negative for chest pain, palpitations and leg swelling. Gastrointestinal: Negative for abdominal pain and blood in stool. Musculoskeletal: Negative for arthralgias and myalgias. Skin: Negative for rash. Neurological: Negative for light-headedness and headaches. Hematological: Negative for adenopathy. Does not bruise/bleed easily. Psychiatric/Behavioral: Negative for dysphoric mood, sleep disturbance and suicidal ideas. The patient is not nervous/anxious. OBJECTIVE:  /70   Pulse 90   Temp 96.6 °F (35.9 °C)   Resp 18   Ht 5' 1\" (1.549 m)   Wt 126 lb 3.2 oz (57.2 kg)   SpO2 98%   BMI 23.85 kg/m²   Physical Exam  Vitals and nursing note reviewed. Constitutional:       Appearance: She is well-developed. Eyes:      Pupils: Pupils are equal, round, and reactive to light. Neck:      Thyroid: No thyromegaly. Vascular: No JVD. Cardiovascular:      Rate and Rhythm: Normal rate and regular rhythm.    Pulmonary:      Effort: Pulmonary effort is normal.      Breath sounds: Normal breath sounds. Abdominal:      General: Bowel sounds are normal.      Palpations: Abdomen is soft. Tenderness: There is no abdominal tenderness. Musculoskeletal:      Cervical back: Normal range of motion and neck supple. Skin:     Findings: No rash. Neurological:      Mental Status: She is alert and oriented to person, place, and time. Psychiatric:         Behavior: Behavior normal.         Thought Content: Thought content normal.         ASSESSMENT/PLAN:    1. Hyperlipidemia, unspecified hyperlipidemia type    Continue with current management and refill meds  Appropriate diet management  Labs  - Comprehensive Metabolic Panel; Future  - CK; Future  - CBC Auto Differential; Future  - Lipid Panel; Future    2. Depression, unspecified depression type  Stress management. Continue refill meds    3. Essential hypertension  Continue current management  Ambulatory blood pressure checks  DASH diet reminder  4. Prediabetes    - Hemoglobin A1C; Future    5. Gastroesophageal reflux disease without esophagitis  . GERD precautions. Continue refill Pepcid    6. Insomnia, unspecified type  Appropriate sleep hygiene. Continue refill trazodone    7. Anxiety  Continue with current treatment    8. Allergic rhinitis, unspecified seasonality, unspecified trigger  Continue with as needed Flonase    9. 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 Not currently following up with rheumatology    10.  Need for vaccination    - INFLUENZA, MDCK QUADV, 2 YRS AND OLDER, IM, PF, PREFILL SYR OR SDV, 0.5ML (FLUCELVAX QUADV, PF)    Mammogram up-to-date advised again and order placed  Orders Placed This Encounter   Procedures    INFLUENZA, MDCK QUADV, 2 YRS AND OLDER, IM, PF, PREFILL SYR OR SDV, 0.5ML (FLUCELVAX QUADV, PF)    Comprehensive Metabolic Panel     Standing Status:   Future     Standing Expiration Date:   11/4/2022    CK Standing Status:   Future     Standing Expiration Date:   11/4/2022    CBC Auto Differential     Standing Status:   Future     Standing Expiration Date:   11/4/2022    Hemoglobin A1C     Standing Status:   Future     Standing Expiration Date:   11/4/2022    Lipid Panel     Standing Status:   Future     Standing Expiration Date:   11/4/2022     Order Specific Question:   Is Patient Fasting?/# of Hours     Answer:   10 hrs     Current Outpatient Medications   Medication Sig Dispense Refill    traZODone (DESYREL) 100 MG tablet 1 po qhs 30 tablet 2    rosuvastatin (CRESTOR) 20 MG tablet Take 1 tablet by mouth nightly 30 tablet 2    losartan (COZAAR) 50 MG tablet Take 1 tablet by mouth daily 30 tablet 2    gemfibrozil (LOPID) 600 MG tablet Take 1 tablet by mouth 2 times daily (before meals) 60 tablet 2    Icosapent Ethyl (VASCEPA) 1 g CAPS capsule TAKE 2 CAPSULES BY MOUTH TWICE DAILY 120 capsule 2    famotidine (PEPCID) 20 MG tablet Take 1 tablet by mouth daily 30 tablet 2    folic acid (FOLVITE) 1 MG tablet Take 1 tablet by mouth daily 30 tablet 2    citalopram (CELEXA) 40 MG tablet Take 1 tablet by mouth daily 30 tablet 2    ondansetron (ZOFRAN) 4 MG tablet Take 1 tablet by mouth every 12 hours as needed for Nausea or Vomiting 8 tablet 0    fluticasone (FLONASE) 50 MCG/ACT nasal spray 1 spray by Nasal route daily as needed for Rhinitis or Allergies 1 Bottle 2    methylfolate (DEPLIN) 7.5 MG TABS tablet Take 7.5 tablets by mouth daily 30 tablet 1    pantoprazole (PROTONIX) 20 MG tablet TAKE 1 TABLET BY MOUTH DAILY 30 tablet 2    PROAIR  (90 BASE) MCG/ACT inhaler INHALE 2 PUFFS INTO THE LUNGS EVERY 6 HOURS AS NEEDED FOR WHEEZING 8.5 g 0    Biotin 10 MG tablet Take 10 mg by mouth daily      Cholecalciferol (VITAMIN D) 2000 UNITS CAPS capsule Take by mouth      vitamin B-12 (CYANOCOBALAMIN) 100 MCG tablet Take 50 mcg by mouth daily      Pyridoxine HCl (VITAMIN B-6) 50 MG tablet Take 50 mg by mouth daily       No current facility-administered medications for this visit. Return in about 3 months (around 1/15/2022), or if symptoms worsen or fail to improve, for hyperlipidmeia, depression.     Gilberto Frank MD, MD

## 2021-11-15 NOTE — TELEPHONE ENCOUNTER
Patient will be going to [facility] and leaving on Thursday, 11/18/21. Patient needs refills on her traZODone (DESYREL) 100 MG tablet, rosuvastatin (CRESTOR) 20 MG tablet, losartan (COZAAR) 50 MG tablet, and citalopram (CELEXA) 40 MG tablet. Patient would like a call back to know that the refills have been called in.

## 2022-01-11 ENCOUNTER — OFFICE VISIT (OUTPATIENT)
Dept: FAMILY MEDICINE CLINIC | Age: 67
End: 2022-01-11
Payer: COMMERCIAL

## 2022-01-11 VITALS
HEIGHT: 61 IN | SYSTOLIC BLOOD PRESSURE: 122 MMHG | OXYGEN SATURATION: 98 % | TEMPERATURE: 97.2 F | BODY MASS INDEX: 23.79 KG/M2 | WEIGHT: 126 LBS | HEART RATE: 95 BPM | DIASTOLIC BLOOD PRESSURE: 78 MMHG

## 2022-01-11 DIAGNOSIS — F32.A DEPRESSION, UNSPECIFIED DEPRESSION TYPE: ICD-10-CM

## 2022-01-11 DIAGNOSIS — Z12.31 ENCOUNTER FOR SCREENING MAMMOGRAM FOR MALIGNANT NEOPLASM OF BREAST: ICD-10-CM

## 2022-01-11 DIAGNOSIS — F41.9 ANXIETY: ICD-10-CM

## 2022-01-11 DIAGNOSIS — R73.03 PREDIABETES: ICD-10-CM

## 2022-01-11 DIAGNOSIS — E78.5 HYPERLIPIDEMIA, UNSPECIFIED HYPERLIPIDEMIA TYPE: Primary | ICD-10-CM

## 2022-01-11 DIAGNOSIS — K21.9 GASTROESOPHAGEAL REFLUX DISEASE WITHOUT ESOPHAGITIS: ICD-10-CM

## 2022-01-11 DIAGNOSIS — I10 ESSENTIAL HYPERTENSION: ICD-10-CM

## 2022-01-11 DIAGNOSIS — J30.9 ALLERGIC RHINITIS, UNSPECIFIED SEASONALITY, UNSPECIFIED TRIGGER: ICD-10-CM

## 2022-01-11 PROCEDURE — 99214 OFFICE O/P EST MOD 30 MIN: CPT | Performed by: FAMILY MEDICINE

## 2022-01-11 PROCEDURE — 3017F COLORECTAL CA SCREEN DOC REV: CPT | Performed by: FAMILY MEDICINE

## 2022-01-11 PROCEDURE — G8427 DOCREV CUR MEDS BY ELIG CLIN: HCPCS | Performed by: FAMILY MEDICINE

## 2022-01-11 PROCEDURE — G8482 FLU IMMUNIZE ORDER/ADMIN: HCPCS | Performed by: FAMILY MEDICINE

## 2022-01-11 PROCEDURE — G8420 CALC BMI NORM PARAMETERS: HCPCS | Performed by: FAMILY MEDICINE

## 2022-01-11 PROCEDURE — 1036F TOBACCO NON-USER: CPT | Performed by: FAMILY MEDICINE

## 2022-01-11 RX ORDER — FAMOTIDINE 20 MG/1
20 TABLET, FILM COATED ORAL DAILY
Qty: 30 TABLET | Refills: 2 | Status: SHIPPED | OUTPATIENT
Start: 2022-01-11 | End: 2022-04-26 | Stop reason: SDUPTHER

## 2022-01-11 RX ORDER — ROSUVASTATIN CALCIUM 20 MG/1
20 TABLET, COATED ORAL NIGHTLY
Qty: 30 TABLET | Refills: 2 | Status: SHIPPED | OUTPATIENT
Start: 2022-01-11 | End: 2022-04-26 | Stop reason: SDUPTHER

## 2022-01-11 RX ORDER — FOLIC ACID 1 MG/1
1 TABLET ORAL DAILY
Qty: 30 TABLET | Refills: 2 | Status: SHIPPED | OUTPATIENT
Start: 2022-01-11 | End: 2022-04-18

## 2022-01-11 RX ORDER — CITALOPRAM 40 MG/1
40 TABLET ORAL DAILY
Qty: 30 TABLET | Refills: 2 | Status: SHIPPED | OUTPATIENT
Start: 2022-01-11 | End: 2022-04-26 | Stop reason: SDUPTHER

## 2022-01-11 RX ORDER — TRAZODONE HYDROCHLORIDE 100 MG/1
TABLET ORAL
Qty: 30 TABLET | Refills: 2 | Status: SHIPPED | OUTPATIENT
Start: 2022-01-11 | End: 2022-04-26 | Stop reason: SDUPTHER

## 2022-01-11 RX ORDER — ICOSAPENT ETHYL 1000 MG/1
CAPSULE ORAL
Qty: 120 CAPSULE | Refills: 2 | Status: SHIPPED | OUTPATIENT
Start: 2022-01-11 | End: 2022-04-26 | Stop reason: SDUPTHER

## 2022-01-11 RX ORDER — GEMFIBROZIL 600 MG/1
600 TABLET, FILM COATED ORAL
Qty: 60 TABLET | Refills: 2 | Status: SHIPPED | OUTPATIENT
Start: 2022-01-11 | End: 2022-04-26 | Stop reason: SDUPTHER

## 2022-01-11 RX ORDER — LOSARTAN POTASSIUM 50 MG/1
50 TABLET ORAL DAILY
Qty: 30 TABLET | Refills: 2 | Status: SHIPPED | OUTPATIENT
Start: 2022-01-11 | End: 2022-04-26 | Stop reason: SDUPTHER

## 2022-01-11 ASSESSMENT — ENCOUNTER SYMPTOMS
ABDOMINAL PAIN: 0
BLOOD IN STOOL: 0
COUGH: 0
SHORTNESS OF BREATH: 0
CHEST TIGHTNESS: 0

## 2022-01-11 NOTE — PROGRESS NOTES
SUBJECTIVE:  Zac Ortiz   1955   female   Allergies   Allergen Reactions    Asa [Aspirin]     Atorvastatin Diarrhea    Cephalexin     Naproxen      Sleepy, GI upset       Chief Complaint   Patient presents with    Depression    Hyperlipidemia    Gastroesophageal Reflux        Patient Active Problem List    Diagnosis Date Noted    Non-recurrent unilateral inguinal hernia without obstruction or gangrene     Hemorrhoid 04/28/2017    Iron deficiency anemia 04/28/2017    Essential hypertension 10/25/2016    MGUS (monoclonal gammopathy of unknown significance) 10/07/2016    Macrocytic anemia 10/07/2016    Insomnia 02/07/2016    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 ([facility] 11/02/2015    Pure hypercholesterolemia 11/02/2015    Hypertriglyceridemia, familial 11/02/2015    Concussion with brief (less than one hour) loss of consciousness 09/01/2015    Concussion with < 1 hr loss of consciousness 09/01/2015    Fracture of multiple ribs 08/27/2015    Gastroduodenitis 07/01/2015    Heterozygous MTHFR mutation [id number] 12/16/2014    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    Vitamin D deficiency 12/16/2014     Overview:   Replaced inactive diagnosis term via diagnosis import      Allergic rhinitis 04/28/2014    Menopause 04/28/2014    Steatohepatitis     RA (rheumatoid arthritis) ([facility]     Anxiety     Hyperlipidemia     Synovial cyst 05/14/2013    Arthritis of hand, degenerative 02/14/2013    Alopecia 11/27/2012    Loss of hair 11/27/2012    Dry mouth 11/13/2012    Rash 11/13/2012    Dry eyes 11/13/2012    Joint swelling 11/13/2012    Elevated rheumatoid factor 11/13/2012       HPI   She is here today for follow-up on hyperlipidemia, anxiety/depression, hypertension, prediabetes, GERD, allergic rhinitis. Patient reports that she has recently applied for life insurance and she was declined because her liver function/enzymes were elevated.   Patient does have history of alcohol use and she has been advised in the past that her LFTs have been elevated intermittently but probably due to alcohol use. Patient does admit to drinking fairly regularly. She denies any GI symptoms or nausea vomiting or bowel changes. Last LFTs here were in the normal range. She has also had an episode of elevated creatinine about a year half ago but that has corrected since then. Last creatinine level was in the normal range. Patient denies symptoms of depression or anxiety. She has been sleeping well. Pepcid continues to help/resolved GERD complaints. As needed Flonase for seasonal allergy symptoms. No other concerns questions today. Last cholesterol labs was okay with total cholesterol 186 and LDL of 54 and HDL of 109. Patient does have history of prediabetes and last hemoglobin A1c was okay at 5.1. Patient also has history of intermittent low H&H. Last H&H was okay at 11.4 and 34.8. She has been evaluated by GI for this in the past.  No symptoms. No other concerns questions today. Past Medical History:   Diagnosis Date    Allergic     Anemia     Anxiety     Depression     Hyperlipidemia     Hypertension     [facility]) [facility]     Steatohepatitis      Social History     Socioeconomic History    Marital status:      Spouse name: Not on file    Number of children: Not on file    Years of education: Not on file    Highest education level: Not on file   Occupational History    Not on file   Tobacco Use    Smoking status: Former Smoker     Packs/day: 0.25     Years: 5.00     Pack years: 1.25     Types: Cigarettes     Quit date:      Years since quittin.0    Smokeless tobacco: Never Used   Vaping Use    Vaping Use: Never used   Substance and Sexual Activity    Alcohol use:  Yes     Alcohol/week: 14.0 standard drinks     Types: 14 Glasses of wine per week    Drug use: No    Sexual activity: Not on file   Other Topics Concern  Not on file   Social History Narrative    Not on file     Social Determinants of Health     Financial Resource Strain:     Difficulty of Paying Living Expenses: Not on file   Food Insecurity:     Worried About Running Out of Food in the Last Year: Not on file    [place] of Food in the Last Year: Not on file   Transportation Needs:     Lack of Transportation (Medical): Not on file    Lack of Transportation (Non-Medical): Not on file   Physical Activity:     Days of Exercise per Week: Not on file    Minutes of Exercise per Session: Not on file   Stress:     Feeling of Stress : Not on file   Social Connections:     Frequency of Communication with Friends and Family: Not on file    Frequency of Social Gatherings with Friends and Family: Not on file    Attends [employer] Services: Not on file    Active Member of [address] [place] [address] [email or web address] or Organizations: Not on file    Attends Club or Organization Meetings: Not on file    Marital Status: Not on file   Intimate Partner Violence:     Fear of Current or Ex-Partner: Not on file    Emotionally Abused: Not on file    Physically Abused: Not on file    Sexually Abused: Not on file   Housing Stability:     Unable to Pay for Housing in the Last Year: Not on file    Number of [place] in the Last Year: Not on file    Unstable Housing in the Last Year: Not on file     Family History   Problem Relation Age of Onset    Diabetes Sister     Cancer Mother         breast cancer       Review of Systems   Constitutional: Negative for activity change, appetite change and unexpected weight change. Respiratory: Negative for cough, chest tightness and shortness of breath. Cardiovascular: Negative for chest pain, palpitations and leg swelling. Gastrointestinal: Negative for abdominal pain and blood in stool. Musculoskeletal: Negative for arthralgias and myalgias. Skin: Negative for rash. Neurological: Negative for light-headedness and headaches.    Hematological: Negative unspecified seasonality, unspecified trigger  Continue with as needed OTC treatment    8. Encounter for screening mammogram for malignant neoplasm of breast  Mammogram advised again  - MISSY DIGITAL SCREEN W OR WO CAD BILATERAL; Future    9.   Elevated LFTs  Patient was advised to cut down on alcohol use and recheck Blood work next week      Orders Placed This Encounter   Procedures    MISSY DIGITAL SCREEN W OR WO CAD BILATERAL     Standing Status:   Future     Standing Expiration Date:   3/11/2023    Comprehensive Metabolic Panel     Standing Status:   Future     Standing Expiration Date:   1/31/2023    CBC Auto Differential     Standing Status:   Future     Standing Expiration Date:   1/31/2023     Current Outpatient Medications   Medication Sig Dispense Refill    traZODone (DESYREL) 100 MG tablet 1 po qhs 30 tablet 2    rosuvastatin (CRESTOR) 20 MG tablet Take 1 tablet by mouth nightly 30 tablet 2    losartan (COZAAR) 50 MG tablet Take 1 tablet by mouth daily 30 tablet 2    gemfibrozil (LOPID) 600 MG tablet Take 1 tablet by mouth 2 times daily (before meals) 60 tablet 2    Icosapent Ethyl (VASCEPA) 1 g CAPS capsule TAKE 2 CAPSULES BY MOUTH TWICE DAILY 120 capsule 2    famotidine (PEPCID) 20 MG tablet Take 1 tablet by mouth daily 30 tablet 2    folic acid (FOLVITE) 1 MG tablet Take 1 tablet by mouth daily 30 tablet 2    citalopram (CELEXA) 40 MG tablet Take 1 tablet by mouth daily 30 tablet 2    fluticasone (FLONASE) 50 MCG/ACT nasal spray 1 spray by Nasal route daily as needed for Rhinitis or Allergies 1 Bottle 2    vitamin B-12 (CYANOCOBALAMIN) 100 MCG tablet Take 50 mcg by mouth daily      Pyridoxine HCl (VITAMIN B-6) 50 MG tablet Take 50 mg by mouth daily      ondansetron (ZOFRAN) 4 MG tablet Take 1 tablet by mouth every 12 hours as needed for Nausea or Vomiting (Patient not taking: Reported on 1/11/2022) 8 tablet 0    methylfolate (DEPLIN) 7.5 MG TABS tablet Take 7.5 tablets by mouth daily (Patient not taking: Reported on 1/11/2022) 30 tablet 1    pantoprazole (PROTONIX) 20 MG tablet TAKE 1 TABLET BY MOUTH DAILY (Patient not taking: Reported on 1/11/2022) 30 tablet 2    PROAIR  (90 BASE) MCG/ACT inhaler INHALE 2 PUFFS INTO THE LUNGS EVERY 6 HOURS AS NEEDED FOR WHEEZING (Patient not taking: Reported on 1/11/2022) 8.5 g 0    Biotin 10 MG tablet Take 10 mg by mouth daily (Patient not taking: Reported on 1/11/2022)      Cholecalciferol (VITAMIN D) 2000 UNITS CAPS capsule Take by mouth (Patient not taking: Reported on 1/11/2022)       No current facility-administered medications for this visit. Return in about 3 months (around 4/11/2022), or if symptoms worsen or fail to improve, for hyperlipidmeia, .     Rodolfo Gold MD, MD

## 2022-02-28 RX ORDER — FOLIC ACID 1 MG/1
1 TABLET ORAL DAILY
Qty: 30 TABLET | Refills: 2 | OUTPATIENT
Start: 2022-02-28

## 2022-03-16 DIAGNOSIS — E78.5 HYPERLIPIDEMIA, UNSPECIFIED HYPERLIPIDEMIA TYPE: ICD-10-CM

## 2022-03-16 DIAGNOSIS — D64.9 ANEMIA, UNSPECIFIED TYPE: Primary | ICD-10-CM

## 2022-03-16 DIAGNOSIS — R73.03 PREDIABETES: ICD-10-CM

## 2022-04-18 RX ORDER — FOLIC ACID 1 MG/1
1 TABLET ORAL DAILY
Qty: 15 TABLET | Refills: 0 | Status: SHIPPED | OUTPATIENT
Start: 2022-04-18 | End: 2022-08-03

## 2022-04-21 DIAGNOSIS — E78.5 HYPERLIPIDEMIA, UNSPECIFIED HYPERLIPIDEMIA TYPE: ICD-10-CM

## 2022-04-21 DIAGNOSIS — R73.03 PREDIABETES: ICD-10-CM

## 2022-04-21 DIAGNOSIS — D64.9 ANEMIA, UNSPECIFIED TYPE: ICD-10-CM

## 2022-04-21 LAB
A/G RATIO: 1.8 (ref 1.1–2.2)
ALBUMIN SERPL-MCNC: 4.6 G/DL (ref 3.4–5)
ALP BLD-CCNC: 101 U/L (ref 40–129)
ALT SERPL-CCNC: 24 U/L (ref 10–40)
ANION GAP SERPL CALCULATED.3IONS-SCNC: 9 MMOL/L (ref 3–16)
AST SERPL-CCNC: 40 U/L (ref 15–37)
BASOPHILS ABSOLUTE: 0 K/UL (ref 0–0.2)
BASOPHILS RELATIVE PERCENT: 0.4 %
BILIRUB SERPL-MCNC: 0.3 MG/DL (ref 0–1)
BUN BLDV-MCNC: 21 MG/DL (ref 7–20)
CALCIUM SERPL-MCNC: 10.3 MG/DL (ref 8.3–10.6)
CHLORIDE BLD-SCNC: 104 MMOL/L (ref 99–110)
CHOLESTEROL, TOTAL: 171 MG/DL (ref 0–199)
CO2: 30 MMOL/L (ref 21–32)
CREAT SERPL-MCNC: 1 MG/DL (ref 0.6–1.2)
EOSINOPHILS ABSOLUTE: 0 K/UL (ref 0–0.6)
EOSINOPHILS RELATIVE PERCENT: 0.8 %
FOLATE: >20 NG/ML (ref 4.78–24.2)
GFR AFRICAN AMERICAN: >60
GFR NON-AFRICAN AMERICAN: 56
GLUCOSE BLD-MCNC: 97 MG/DL (ref 70–99)
HCT VFR BLD CALC: 36.5 % (ref 36–48)
HDLC SERPL-MCNC: 75 MG/DL (ref 40–60)
HEMOGLOBIN: 12.1 G/DL (ref 12–16)
IRON: 68 UG/DL (ref 37–145)
LDL CHOLESTEROL CALCULATED: 88 MG/DL
LYMPHOCYTES ABSOLUTE: 1.7 K/UL (ref 1–5.1)
LYMPHOCYTES RELATIVE PERCENT: 41.5 %
MCH RBC QN AUTO: 34.1 PG (ref 26–34)
MCHC RBC AUTO-ENTMCNC: 33.1 G/DL (ref 31–36)
MCV RBC AUTO: 103.2 FL (ref 80–100)
MONOCYTES ABSOLUTE: 0.3 K/UL (ref 0–1.3)
MONOCYTES RELATIVE PERCENT: 7.2 %
NEUTROPHILS ABSOLUTE: 2.1 K/UL (ref 1.7–7.7)
NEUTROPHILS RELATIVE PERCENT: 50.1 %
PDW BLD-RTO: 14.1 % (ref 12.4–15.4)
PLATELET # BLD: 220 K/UL (ref 135–450)
PMV BLD AUTO: 9.6 FL (ref 5–10.5)
POTASSIUM SERPL-SCNC: 4.5 MMOL/L (ref 3.5–5.1)
RBC # BLD: 3.54 M/UL (ref 4–5.2)
SODIUM BLD-SCNC: 143 MMOL/L (ref 136–145)
TOTAL PROTEIN: 7.2 G/DL (ref 6.4–8.2)
TRIGL SERPL-MCNC: 41 MG/DL (ref 0–150)
VITAMIN B-12: >2000 PG/ML (ref 211–911)
VLDLC SERPL CALC-MCNC: 8 MG/DL
WBC # BLD: 4.2 K/UL (ref 4–11)

## 2022-04-22 LAB
ESTIMATED AVERAGE GLUCOSE: 108.3 MG/DL
HBA1C MFR BLD: 5.4 %

## 2022-04-26 ENCOUNTER — OFFICE VISIT (OUTPATIENT)
Dept: FAMILY MEDICINE CLINIC | Age: 67
End: 2022-04-26
Payer: COMMERCIAL

## 2022-04-26 VITALS
HEART RATE: 88 BPM | TEMPERATURE: 96.6 F | BODY MASS INDEX: 23.81 KG/M2 | SYSTOLIC BLOOD PRESSURE: 104 MMHG | OXYGEN SATURATION: 97 % | DIASTOLIC BLOOD PRESSURE: 66 MMHG | WEIGHT: 126 LBS

## 2022-04-26 DIAGNOSIS — R73.03 PREDIABETES: ICD-10-CM

## 2022-04-26 DIAGNOSIS — K21.9 GASTROESOPHAGEAL REFLUX DISEASE WITHOUT ESOPHAGITIS: ICD-10-CM

## 2022-04-26 DIAGNOSIS — F32.A DEPRESSION, UNSPECIFIED DEPRESSION TYPE: ICD-10-CM

## 2022-04-26 DIAGNOSIS — J30.9 ALLERGIC RHINITIS, UNSPECIFIED SEASONALITY, UNSPECIFIED TRIGGER: ICD-10-CM

## 2022-04-26 DIAGNOSIS — I10 ESSENTIAL HYPERTENSION: ICD-10-CM

## 2022-04-26 DIAGNOSIS — M25.572 ACUTE LEFT ANKLE PAIN: ICD-10-CM

## 2022-04-26 DIAGNOSIS — E78.5 HYPERLIPIDEMIA, UNSPECIFIED HYPERLIPIDEMIA TYPE: Primary | ICD-10-CM

## 2022-04-26 DIAGNOSIS — F41.9 ANXIETY: ICD-10-CM

## 2022-04-26 DIAGNOSIS — M05.731 RHEUMATOID ARTHRITIS INVOLVING RIGHT WRIST WITH POSITIVE RHEUMATOID FACTOR (HCC): ICD-10-CM

## 2022-04-26 DIAGNOSIS — D64.9 ANEMIA, UNSPECIFIED TYPE: ICD-10-CM

## 2022-04-26 PROCEDURE — 1036F TOBACCO NON-USER: CPT | Performed by: FAMILY MEDICINE

## 2022-04-26 PROCEDURE — 99214 OFFICE O/P EST MOD 30 MIN: CPT | Performed by: FAMILY MEDICINE

## 2022-04-26 PROCEDURE — G8427 DOCREV CUR MEDS BY ELIG CLIN: HCPCS | Performed by: FAMILY MEDICINE

## 2022-04-26 PROCEDURE — 3017F COLORECTAL CA SCREEN DOC REV: CPT | Performed by: FAMILY MEDICINE

## 2022-04-26 PROCEDURE — G8420 CALC BMI NORM PARAMETERS: HCPCS | Performed by: FAMILY MEDICINE

## 2022-04-26 RX ORDER — TRAZODONE HYDROCHLORIDE 100 MG/1
TABLET ORAL
Qty: 30 TABLET | Refills: 2 | Status: SHIPPED | OUTPATIENT
Start: 2022-04-26 | End: 2022-07-26 | Stop reason: SDUPTHER

## 2022-04-26 RX ORDER — FAMOTIDINE 20 MG/1
20 TABLET, FILM COATED ORAL DAILY
Qty: 30 TABLET | Refills: 2 | Status: SHIPPED | OUTPATIENT
Start: 2022-04-26 | End: 2022-07-26 | Stop reason: SDUPTHER

## 2022-04-26 RX ORDER — LEVOMEFOLATE CALCIUM 7.5 MG TABLET
1 TABLET DAILY
Qty: 30 TABLET | Refills: 2 | Status: CANCELLED | OUTPATIENT
Start: 2022-04-26

## 2022-04-26 RX ORDER — ROSUVASTATIN CALCIUM 20 MG/1
20 TABLET, COATED ORAL NIGHTLY
Qty: 30 TABLET | Refills: 2 | Status: SHIPPED | OUTPATIENT
Start: 2022-04-26 | End: 2022-07-26 | Stop reason: SDUPTHER

## 2022-04-26 RX ORDER — LOSARTAN POTASSIUM 50 MG/1
50 TABLET ORAL DAILY
Qty: 30 TABLET | Refills: 2 | Status: SHIPPED | OUTPATIENT
Start: 2022-04-26 | End: 2022-07-26 | Stop reason: SDUPTHER

## 2022-04-26 RX ORDER — ICOSAPENT ETHYL 1000 MG/1
CAPSULE ORAL
Qty: 120 CAPSULE | Refills: 2 | Status: SHIPPED | OUTPATIENT
Start: 2022-04-26 | End: 2022-07-26 | Stop reason: SDUPTHER

## 2022-04-26 RX ORDER — CITALOPRAM 40 MG/1
40 TABLET ORAL DAILY
Qty: 30 TABLET | Refills: 2 | Status: SHIPPED | OUTPATIENT
Start: 2022-04-26 | End: 2022-07-26 | Stop reason: SDUPTHER

## 2022-04-26 RX ORDER — GEMFIBROZIL 600 MG/1
600 TABLET, FILM COATED ORAL
Qty: 60 TABLET | Refills: 2 | Status: SHIPPED | OUTPATIENT
Start: 2022-04-26 | End: 2022-07-26 | Stop reason: SDUPTHER

## 2022-04-26 RX ORDER — METHYLPREDNISOLONE 4 MG/1
TABLET ORAL
Qty: 1 KIT | Refills: 0 | Status: SHIPPED | OUTPATIENT
Start: 2022-04-26 | End: 2022-05-02

## 2022-04-26 ASSESSMENT — PATIENT HEALTH QUESTIONNAIRE - PHQ9
SUM OF ALL RESPONSES TO PHQ QUESTIONS 1-9: 1
SUM OF ALL RESPONSES TO PHQ QUESTIONS 1-9: 1
1. LITTLE INTEREST OR PLEASURE IN DOING THINGS: 0
SUM OF ALL RESPONSES TO PHQ9 QUESTIONS 1 & 2: 1
SUM OF ALL RESPONSES TO PHQ QUESTIONS 1-9: 1
SUM OF ALL RESPONSES TO PHQ QUESTIONS 1-9: 1
2. FEELING DOWN, DEPRESSED OR HOPELESS: 1

## 2022-04-27 ASSESSMENT — ENCOUNTER SYMPTOMS
CHEST TIGHTNESS: 0
COUGH: 0
BLOOD IN STOOL: 0
ABDOMINAL PAIN: 0
DIARRHEA: 0
SHORTNESS OF BREATH: 0
CONSTIPATION: 0

## 2022-04-27 NOTE — PROGRESS NOTES
SUBJECTIVE:  Tj Hobbs   1955   female   Allergies   Allergen Reactions    Asa [Aspirin]     Atorvastatin Diarrhea    Cephalexin     Naproxen      Sleepy, GI upset       Chief Complaint   Patient presents with    Medication Check        Patient Active Problem List    Diagnosis Date Noted    Non-recurrent unilateral inguinal hernia without obstruction or gangrene     Hemorrhoid 04/28/2017    Iron deficiency anemia 04/28/2017    Essential hypertension 10/25/2016    MGUS (monoclonal gammopathy of unknown significance) 10/07/2016    Macrocytic anemia 10/07/2016    Insomnia 02/07/2016    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 ([facility] 11/02/2015    Pure hypercholesterolemia 11/02/2015    Hypertriglyceridemia, familial 11/02/2015    Concussion with brief (less than one hour) loss of consciousness 09/01/2015    Concussion with < 1 hr loss of consciousness 09/01/2015    Fracture of multiple ribs 08/27/2015    Gastroduodenitis 07/01/2015    Heterozygous MTHFR mutation [id number] 12/16/2014    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[facility] 12/16/2014    Vitamin D deficiency 12/16/2014     Overview:   Replaced inactive diagnosis term via diagnosis import      Allergic rhinitis 04/28/2014    Menopause 04/28/2014    Steatohepatitis     RA (rheumatoid arthritis) ([facility]     Anxiety     Hyperlipidemia     Synovial cyst 05/14/2013    Arthritis of hand, degenerative 02/14/2013    Alopecia 11/27/2012    Loss of hair 11/27/2012    Dry mouth 11/13/2012    Rash 11/13/2012    Dry eyes 11/13/2012    Joint swelling 11/13/2012    Elevated rheumatoid factor 11/13/2012       HPI   Patient is here today for follow-up on hyperlipidemia, prediabetes, depression/anxiety, GERD, RA, allergic rhinitis, hypertension, intermittent anemia and complaining of left ankle pain. Patient does have history of RA and not currently being followed by rheumatology or on any treatment.   She has not had any trouble with joint pain/arthralgia for a while. She does report left ankle pain for last couple days. Patient report symptoms were fairly acute. She basically went to bed at night and woke up with the pain. She does not recall any specific injuries. No swelling or redness. She has not taken any medications for this. She is unable to take NSAIDs. She has been doing well with current treatment management for hyperlipidemia and hypertension and depression/anxiety. Patient denies chest pain, shortness of breath or myalgias. Denies headache change in vision or neurologic symptoms. No GI or bowel complaints. She did have mild drop in H&H but last labs H&H is back to normal.  Patient has history of prediabetes and recent hemoglobin is okay at 5.4. Cholesterol labs are good with total cholesterol at 171 and LDL at 88 and HDL 75. Patient denies symptoms of depression or anxiety. Sleeping well. Patient reports she has mammogram scheduled already. Past Medical History:   Diagnosis Date    Allergic     Anemia     Anxiety     Depression     Hyperlipidemia     Hypertension     RA [facility]rheumatoid arthritis) ([facility] [facility]     Steatohepatitis      Social History     Socioeconomic History    Marital status:      Spouse name: Not on file    Number of children: Not on file    Years of education: Not on file    Highest education level: Not on file   Occupational History    Not on file   Tobacco Use    Smoking status: Former Smoker     Packs/day: 0.25     Years: 5.00     Pack years: 1.25     Types: Cigarettes     Quit date:      Years since quittin.3    Smokeless tobacco: Never Used   Vaping Use    Vaping Use: Never used   Substance and Sexual Activity    Alcohol use:  Yes     Alcohol/week: 14.0 standard drinks     Types: 14 Glasses of wine per week    Drug use: No    Sexual activity: Not on file   Other Topics Concern    Not on file   Social History Narrative    Not on file     Social Determinants of Health     Financial Resource Strain:     Difficulty of Paying Living Expenses: Not on file   Food Insecurity:     Worried About Running Out of Food in the Last Year: Not on file    Marivel of Food in the Last Year: Not on file   Transportation Needs:     Lack of Transportation (Medical): Not on file    Lack of Transportation (Non-Medical): Not on file   Physical Activity:     Days of Exercise per Week: Not on file    Minutes of Exercise per Session: Not on file   Stress:     Feeling of Stress : Not on file   Social Connections:     Frequency of Communication with Friends and Family: Not on file    Frequency of Social Gatherings with Friends and Family: Not on file    Attends [employer] Services: Not on file    Active Member of [address] [place] [address] [employer] or Organizations: Not on file    Attends Club or Organization Meetings: Not on file    Marital Status: Not on file   Intimate Partner Violence:     Fear of Current or Ex-Partner: Not on file    Emotionally Abused: Not on file    Physically Abused: Not on file    Sexually Abused: Not on file   Housing Stability:     Unable to Pay for Housing in the Last Year: Not on file    Number of [place] in the Last Year: Not on file    Unstable Housing in the Last Year: Not on file     Family History   Problem Relation Age of Onset    Diabetes Sister     Cancer Mother         breast cancer       Review of Systems   Constitutional: Negative for activity change, appetite change and unexpected weight change. Respiratory: Negative for cough, chest tightness and shortness of breath. Cardiovascular: Negative for chest pain, palpitations and leg swelling. Gastrointestinal: Negative for abdominal pain, blood in stool, constipation and diarrhea. Musculoskeletal: Positive for arthralgias. Negative for myalgias. Left ankle pain   Skin: Negative for rash. Neurological: Negative for light-headedness and headaches.    Hematological: Negative for adenopathy. Does not bruise/bleed easily. Psychiatric/Behavioral: Negative for dysphoric mood, sleep disturbance and suicidal ideas. The patient is not nervous/anxious. OBJECTIVE:  /66   Pulse 88   Temp 96.6 °F (35.9 °C)   Wt 126 lb (57.2 kg)   SpO2 97%   BMI 23.81 kg/m²   Physical Exam  Vitals and nursing note reviewed. Constitutional:       Appearance: She is well-developed. Eyes:      Pupils: Pupils are equal, round, and reactive to light. Neck:      Thyroid: No thyromegaly. Vascular: No JVD. Cardiovascular:      Rate and Rhythm: Normal rate and regular rhythm. Pulmonary:      Effort: Pulmonary effort is normal.      Breath sounds: Normal breath sounds. Abdominal:      General: Bowel sounds are normal.      Palpations: Abdomen is soft. Tenderness: There is no abdominal tenderness. Musculoskeletal:      Cervical back: Normal range of motion and neck supple. Skin:     Findings: No rash. Neurological:      Mental Status: She is alert and oriented to person, place, and time. Psychiatric:         Behavior: Behavior normal.         Thought Content: Thought content normal.         ASSESSMENT/PLAN:    1. Hyperlipidemia, unspecified hyperlipidemia type  Continue with current management refill meds  Reviewed labs    2. Prediabetes  Reviewed and discussed recent labs  Continue with diet management and will continue to monitor    3. Depression, unspecified depression type  Stress management. Continue and refill Celexa    4. Gastroesophageal reflux disease without esophagitis  No GI symptoms with Pepcid    5. 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 (HCC)  Left ankle pain possibly related to RA. We will get ankle x-ray and try Medrol Dosepak    6. Allergic rhinitis, unspecified seasonality, unspecified trigger  Continue with as needed Flonase    7. Essential hypertension  Refill meds. Ambulatory blood pressure checks    8.  Acute left ankle pain  Medrol Dosepak. Ankle wrap to immobilize. Ice. Elevate  - XR ANKLE LEFT (2 VIEWS); Future    9. Anxiety  Just management. Continue Celexa    10. Anemia, unspecified type  Reviewed recent labs. We will continue to monitor    Mammogram advised  Orders Placed This Encounter   Procedures    XR ANKLE LEFT (2 VIEWS)     Standing Status:   Future     Standing Expiration Date:   4/26/2023     Current Outpatient Medications   Medication Sig Dispense Refill    traZODone (DESYREL) 100 MG tablet 1 po qhs 30 tablet 2    rosuvastatin (CRESTOR) 20 MG tablet Take 1 tablet by mouth nightly 30 tablet 2    losartan (COZAAR) 50 MG tablet Take 1 tablet by mouth daily 30 tablet 2    gemfibrozil (LOPID) 600 MG tablet Take 1 tablet by mouth 2 times daily (before meals) 60 tablet 2    Icosapent Ethyl (VASCEPA) 1 g CAPS capsule TAKE 2 CAPSULES BY MOUTH TWICE DAILY 120 capsule 2    famotidine (PEPCID) 20 MG tablet Take 1 tablet by mouth daily 30 tablet 2    citalopram (CELEXA) 40 MG tablet Take 1 tablet by mouth daily 30 tablet 2    L-Methylfolate-Algae (DEPLIN 7.5 PO) Take by mouth      methylPREDNISolone (MEDROL DOSEPACK) 4 MG tablet Take by mouth.  1 kit 0    folic acid (FOLVITE) 1 MG tablet TAKE 1 TABLET BY MOUTH DAILY 15 tablet 0    fluticasone (FLONASE) 50 MCG/ACT nasal spray 1 spray by Nasal route daily as needed for Rhinitis or Allergies 1 Bottle 2    methylfolate (DEPLIN) 7.5 MG TABS tablet Take 7.5 tablets by mouth daily (Patient taking differently: Take 1 tablet by mouth daily ) 30 tablet 1    PROAIR  (90 BASE) MCG/ACT inhaler INHALE 2 PUFFS INTO THE LUNGS EVERY 6 HOURS AS NEEDED FOR WHEEZING 8.5 g 0    vitamin B-12 (CYANOCOBALAMIN) 100 MCG tablet Take 50 mcg by mouth daily      Pyridoxine HCl (VITAMIN B-6) 50 MG tablet Take 50 mg by mouth daily      ondansetron (ZOFRAN) 4 MG tablet Take 1 tablet by mouth every 12 hours as needed for Nausea or Vomiting (Patient not taking: Reported on 1/11/2022) 8 tablet 0    pantoprazole (PROTONIX) 20 MG tablet TAKE 1 TABLET BY MOUTH DAILY (Patient not taking: Reported on 1/11/2022) 30 tablet 2    Biotin 10 MG tablet Take 10 mg by mouth daily (Patient not taking: Reported on 1/11/2022)      Cholecalciferol (VITAMIN D) 2000 UNITS CAPS capsule Take by mouth (Patient not taking: Reported on 1/11/2022)       No current facility-administered medications for this visit. Return in about 3 months (around 7/26/2022), or if symptoms worsen or fail to improve, for depression, .     Darien Mercado MD, MD

## 2022-05-04 ENCOUNTER — HOSPITAL ENCOUNTER (OUTPATIENT)
Dept: GENERAL RADIOLOGY | Age: 67
Discharge: HOME | End: 2022-05-04
Payer: COMMERCIAL

## 2022-05-04 DIAGNOSIS — M25.572 ACUTE LEFT ANKLE PAIN: ICD-10-CM

## 2022-05-04 PROCEDURE — 73610 X-RAY EXAM OF ANKLE: CPT

## 2022-06-23 ENCOUNTER — TELEPHONE (OUTPATIENT)
Dept: OTHER | Facility: CLINIC | Age: 67
End: 2022-06-23

## 2022-06-23 NOTE — TELEPHONE ENCOUNTER
Holly Left, Was contacted today as part of [facility] to schedule a mammogram.         After speaking with patient she has not had a recent mammogram.  Reminded patient that they have an open order from  Hai Toro MD and educated on the importance of routine breast cancer screenings. Pt states an understanding and is agreeable to scheduling. Mammogram scheduled at [facility] on 6/29/22.       [facility], LPN

## 2022-06-29 ENCOUNTER — HOSPITAL ENCOUNTER (OUTPATIENT)
Dept: MAMMOGRAPHY | Age: 67
Discharge: HOME | End: 2022-06-29
Payer: COMMERCIAL

## 2022-06-29 VITALS — BODY MASS INDEX: 22.66 KG/M2 | WEIGHT: 120 LBS | HEIGHT: 61 IN

## 2022-06-29 DIAGNOSIS — Z12.31 ENCOUNTER FOR SCREENING MAMMOGRAM FOR MALIGNANT NEOPLASM OF BREAST: ICD-10-CM

## 2022-06-29 PROCEDURE — 77063 BREAST TOMOSYNTHESIS BI: CPT

## 2022-07-26 ENCOUNTER — OFFICE VISIT (OUTPATIENT)
Dept: FAMILY MEDICINE CLINIC | Age: 67
End: 2022-07-26
Payer: MEDICARE

## 2022-07-26 VITALS
BODY MASS INDEX: 22.52 KG/M2 | DIASTOLIC BLOOD PRESSURE: 62 MMHG | SYSTOLIC BLOOD PRESSURE: 102 MMHG | HEART RATE: 89 BPM | TEMPERATURE: 97 F | WEIGHT: 119.2 LBS | OXYGEN SATURATION: 96 %

## 2022-07-26 DIAGNOSIS — R73.03 PREDIABETES: ICD-10-CM

## 2022-07-26 DIAGNOSIS — Z23 NEED FOR VACCINATION: ICD-10-CM

## 2022-07-26 DIAGNOSIS — J30.9 ALLERGIC RHINITIS, UNSPECIFIED SEASONALITY, UNSPECIFIED TRIGGER: ICD-10-CM

## 2022-07-26 DIAGNOSIS — Z78.0 MENOPAUSE: ICD-10-CM

## 2022-07-26 DIAGNOSIS — K21.9 GASTROESOPHAGEAL REFLUX DISEASE WITHOUT ESOPHAGITIS: ICD-10-CM

## 2022-07-26 DIAGNOSIS — M05.731 RHEUMATOID ARTHRITIS INVOLVING RIGHT WRIST WITH POSITIVE RHEUMATOID FACTOR (HCC): ICD-10-CM

## 2022-07-26 DIAGNOSIS — E78.5 HYPERLIPIDEMIA, UNSPECIFIED HYPERLIPIDEMIA TYPE: Primary | ICD-10-CM

## 2022-07-26 DIAGNOSIS — I10 ESSENTIAL HYPERTENSION: ICD-10-CM

## 2022-07-26 DIAGNOSIS — F32.A DEPRESSION, UNSPECIFIED DEPRESSION TYPE: ICD-10-CM

## 2022-07-26 PROCEDURE — 99214 OFFICE O/P EST MOD 30 MIN: CPT | Performed by: FAMILY MEDICINE

## 2022-07-26 PROCEDURE — 90732 PPSV23 VACC 2 YRS+ SUBQ/IM: CPT | Performed by: FAMILY MEDICINE

## 2022-07-26 PROCEDURE — G0009 ADMIN PNEUMOCOCCAL VACCINE: HCPCS | Performed by: FAMILY MEDICINE

## 2022-07-26 PROCEDURE — 1123F ACP DISCUSS/DSCN MKR DOCD: CPT | Performed by: FAMILY MEDICINE

## 2022-07-26 RX ORDER — CITALOPRAM 40 MG/1
40 TABLET ORAL DAILY
Qty: 30 TABLET | Refills: 2 | Status: SHIPPED | OUTPATIENT
Start: 2022-07-26 | End: 2022-10-26 | Stop reason: SDUPTHER

## 2022-07-26 RX ORDER — FAMOTIDINE 20 MG/1
20 TABLET, FILM COATED ORAL DAILY
Qty: 30 TABLET | Refills: 2 | Status: SHIPPED | OUTPATIENT
Start: 2022-07-26 | End: 2022-10-26 | Stop reason: SDUPTHER

## 2022-07-26 RX ORDER — GEMFIBROZIL 600 MG/1
600 TABLET, FILM COATED ORAL
Qty: 60 TABLET | Refills: 2 | Status: SHIPPED | OUTPATIENT
Start: 2022-07-26 | End: 2022-10-26 | Stop reason: SDUPTHER

## 2022-07-26 RX ORDER — ROSUVASTATIN CALCIUM 20 MG/1
20 TABLET, COATED ORAL NIGHTLY
Qty: 30 TABLET | Refills: 2 | Status: SHIPPED | OUTPATIENT
Start: 2022-07-26 | End: 2022-10-26 | Stop reason: SDUPTHER

## 2022-07-26 RX ORDER — ICOSAPENT ETHYL 1000 MG/1
CAPSULE ORAL
Qty: 120 CAPSULE | Refills: 2 | Status: SHIPPED | OUTPATIENT
Start: 2022-07-26 | End: 2022-10-26 | Stop reason: SDUPTHER

## 2022-07-26 RX ORDER — LOSARTAN POTASSIUM 50 MG/1
50 TABLET ORAL DAILY
Qty: 30 TABLET | Refills: 2 | Status: SHIPPED | OUTPATIENT
Start: 2022-07-26 | End: 2022-10-26 | Stop reason: SDUPTHER

## 2022-07-26 RX ORDER — TRAZODONE HYDROCHLORIDE 100 MG/1
TABLET ORAL
Qty: 30 TABLET | Refills: 2 | Status: SHIPPED | OUTPATIENT
Start: 2022-07-26 | End: 2022-10-26 | Stop reason: SDUPTHER

## 2022-07-26 ASSESSMENT — PATIENT HEALTH QUESTIONNAIRE - PHQ9
7. TROUBLE CONCENTRATING ON THINGS, SUCH AS READING THE NEWSPAPER OR WATCHING TELEVISION: 3
5. POOR APPETITE OR OVEREATING: 3
10. IF YOU CHECKED OFF ANY PROBLEMS, HOW DIFFICULT HAVE THESE PROBLEMS MADE IT FOR YOU TO DO YOUR WORK, TAKE CARE OF THINGS AT HOME, OR GET ALONG WITH OTHER PEOPLE: 0
8. MOVING OR SPEAKING SO SLOWLY THAT OTHER PEOPLE COULD HAVE NOTICED. OR THE OPPOSITE, BEING SO FIGETY OR RESTLESS THAT YOU HAVE BEEN MOVING AROUND A LOT MORE THAN USUAL: 1
SUM OF ALL RESPONSES TO PHQ9 QUESTIONS 1 & 2: 1
4. FEELING TIRED OR HAVING LITTLE ENERGY: 3
SUM OF ALL RESPONSES TO PHQ QUESTIONS 1-9: 15
2. FEELING DOWN, DEPRESSED OR HOPELESS: 1
6. FEELING BAD ABOUT YOURSELF - OR THAT YOU ARE A FAILURE OR HAVE LET YOURSELF OR YOUR FAMILY DOWN: 1
3. TROUBLE FALLING OR STAYING ASLEEP: 3
SUM OF ALL RESPONSES TO PHQ QUESTIONS 1-9: 15
9. THOUGHTS THAT YOU WOULD BE BETTER OFF DEAD, OR OF HURTING YOURSELF: 0
1. LITTLE INTEREST OR PLEASURE IN DOING THINGS: 0
SUM OF ALL RESPONSES TO PHQ QUESTIONS 1-9: 15
SUM OF ALL RESPONSES TO PHQ QUESTIONS 1-9: 15

## 2022-07-31 ASSESSMENT — ENCOUNTER SYMPTOMS
ABDOMINAL PAIN: 0
SHORTNESS OF BREATH: 0
COUGH: 0
BLOOD IN STOOL: 0
CHEST TIGHTNESS: 0

## 2022-07-31 NOTE — PROGRESS NOTES
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 ([facility] [address] 11/02/2015    Pure hypercholesterolemia 11/02/2015    Hypertriglyceridemia, familial 11/02/2015    Concussion with brief (less than one hour) loss of consciousness 09/01/2015    Concussion with < 1 hr loss of consciousness 09/01/2015    Fracture of multiple ribs 08/27/2015    Gastroduodenitis 07/01/2015    Heterozygous MTHFR mutation [id number] 12/16/2014    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[facility] [address] 12/16/2014    Vitamin D deficiency 12/16/2014     Overview:   Replaced inactive diagnosis term via diagnosis import      Allergic rhinitis 04/28/2014    Menopause 04/28/2014    Steatohepatitis     RA (rheumatoid arthritis) ([facility] [address]     Anxiety     Hyperlipidemia     Synovial cyst 05/14/2013    Arthritis of hand, degenerative 02/14/2013    Alopecia 11/27/2012    Loss of hair 11/27/2012    Dry mouth 11/13/2012    Rash 11/13/2012    Dry eyes 11/13/2012    Joint swelling 11/13/2012    Elevated rheumatoid factor 11/13/2012       HPI   Patient returns today for follow-up on hyperlipidemia, prediabetes, depression/anxiety, GERD, history of RA, hypertension, allergic rhinitis and complaining of intermittent joint pain. Patient has history of RA and has been evaluated and treated by rheumatology several years ago. She has not been on any medications or any treatment since then.   He has been complaining of intermittent pain in different joints including her ankles and wrists and knees. He was advised at last visit to follow-up with rheumatology but she has not done that yet. She is not sure who the rheumatologist was that she went to in the past.  No joint swelling or redness. She has been stable on current treatment for hyperlipidemia. Denies chest pain, shortness of breath or myalgias. Denies headache change in vision or any neurologic symptoms. No GI or bowel complaints. Patient has history of prediabetes and last hemoglobin A1c was good at 5.4. Cholesterol labs were good with total cholesterol 171 and LDL of 88. Patient denies symptoms of depression or anxiety. Sleeping well. No GI complaints. Past Medical History:   Diagnosis Date    Allergic     Anemia     Anxiety     Depression     Hyperlipidemia     Hypertension     RA [facility]umatoid [facility]) [facility]     Steatohepatitis      Social History     Socioeconomic History    Marital status:      Spouse name: Not on file    Number of children: Not on file    Years of education: Not on file    Highest education level: Not on file   Occupational History    Not on file   Tobacco Use    Smoking status: Former     Packs/day: 0.25     Years: 5.00     Pack years: 1.25     Types: Cigarettes     Quit date:      Years since quittin.5    Smokeless tobacco: Never   Vaping Use    Vaping Use: Never used   Substance and Sexual Activity    Alcohol use:  Yes     Alcohol/week: 14.0 standard drinks     Types: 14 Glasses of wine per week    Drug use: No    Sexual activity: Not on file   Other Topics Concern    Not on file   Social History Narrative    Not on file     Social Determinants of Health     Financial Resource Strain: Not on file   Food Insecurity: Not on file   Transportation Needs: Not on file   Physical Activity: Not on file   Stress: Not on file   Social Connections: Not on file   Intimate Partner Violence: Not on file   Housing Stability: Not on file     Family History   Problem Relation Age of Onset    Diabetes Sister     Cancer Mother         breast cancer    Breast Cancer Mother        Review of Systems   Constitutional:  Negative for activity change, appetite change and unexpected weight change. Respiratory:  Negative for cough, chest tightness and shortness of breath. Cardiovascular:  Negative for chest pain, palpitations and leg swelling. Gastrointestinal:  Negative for abdominal pain and blood in stool. Endocrine: Negative for cold intolerance and heat intolerance. Musculoskeletal:  Positive for arthralgias. Negative for myalgias. Skin:  Negative for rash. Neurological:  Negative for light-headedness and headaches. Hematological:  Negative for adenopathy. Does not bruise/bleed easily. Psychiatric/Behavioral:  Negative for dysphoric mood, sleep disturbance and suicidal ideas. The patient is not nervous/anxious. OBJECTIVE:  /62   Pulse 89   Temp 97 °F (36.1 °C)   Wt 119 lb 3.2 oz (54.1 kg)   SpO2 96%   BMI 22.52 kg/m²   Physical Exam  Vitals and nursing note reviewed. Constitutional:       Appearance: She is well-developed. Eyes:      Pupils: Pupils are equal, round, and reactive to light. Neck:      Thyroid: No thyromegaly. Vascular: No JVD. Cardiovascular:      Rate and Rhythm: Normal rate and regular rhythm. Pulmonary:      Effort: Pulmonary effort is normal.      Breath sounds: Normal breath sounds. Abdominal:      General: Bowel sounds are normal.      Palpations: Abdomen is soft. Tenderness: There is no abdominal tenderness. Musculoskeletal:      Cervical back: Normal range of motion and neck supple. Skin:     Findings: No rash. Neurological:      Mental Status: She is alert and oriented to person, place, and time. Cranial Nerves: No cranial nerve deficit.    Psychiatric:         Mood and Affect: Mood normal.         Behavior: Behavior normal. ASSESSMENT/PLAN:    1. Hyperlipidemia, unspecified hyperlipidemia type  Continue with current management and refill meds  Reviewed last labs  Encouraged appropriate diet  2. Prediabetes  Reviewed last hemoglobin A1c. Encouraged increased physical activity    We will continue to follow    3. Depression, unspecified depression type  Continue with current management and refill meds    4. Gastroesophageal reflux disease without esophagitis  GERD precautions. Refill Prevacid    5. 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 (HCC)    - Arminda Matias MD, Rheumatology, [facility]    6. Menopause    - DEXA BONE DENSITY 2 SITES; Future    7. Essential hypertension  Refill meds. Ambulatory blood pressure checks    8. Allergic rhinitis, unspecified seasonality, unspecified trigger  As needed Flonase    9. Need for vaccination    - Pneumococcal, PPSV23, PNEUMOVAX 21, (age 2 yrs+), SC/IM      Orders Placed This Encounter   Procedures    DEXA BONE DENSITY 2 SITES     Standing Status:   Future     Standing Expiration Date:   7/26/2023    Pneumococcal, PPSV23, PNEUMOVAX 21, (age 2 yrs+), SC/IM    Arminda Matias MD, Rheumatology, [facility]     Referral Priority:   Routine     Referral Type:   Eval and Treat     Referral Reason:   Specialty Services Required     Referred to Provider:   Kecia Nolasco MD     Requested Specialty:   Rheumatology     Number of Visits Requested:   1     Current Outpatient Medications   Medication Sig Dispense Refill    traZODone (DESYREL) 100 MG tablet 1 po qhs 30 tablet 2    rosuvastatin (CRESTOR) 20 MG tablet Take 1 tablet by mouth nightly 30 tablet 2    losartan (COZAAR) 50 MG tablet Take 1 tablet by mouth in the morning. 30 tablet 2    gemfibrozil (LOPID) 600 MG tablet Take 1 tablet by mouth in the morning and 1 tablet in the evening. Take before meals.  60 tablet 2    Icosapent Ethyl (VASCEPA) 1 g CAPS capsule TAKE 2 CAPSULES BY MOUTH TWICE DAILY 120 capsule 2    famotidine (PEPCID) 20 MG tablet Take 1 tablet by mouth in the morning. 30 tablet 2    citalopram (CELEXA) 40 MG tablet Take 1 tablet by mouth in the morning. 30 tablet 2    folic acid (FOLVITE) 1 MG tablet TAKE 1 TABLET BY MOUTH DAILY 15 tablet 0    fluticasone (FLONASE) 50 MCG/ACT nasal spray 1 spray by Nasal route daily as needed for Rhinitis or Allergies 1 Bottle 2    vitamin B-12 (CYANOCOBALAMIN) 100 MCG tablet Take 50 mcg by mouth daily      Pyridoxine HCl (VITAMIN B-6) 50 MG tablet Take 50 mg by mouth daily      ondansetron (ZOFRAN) 4 MG tablet Take 1 tablet by mouth every 12 hours as needed for Nausea or Vomiting (Patient not taking: No sig reported) 8 tablet 0    Biotin 10 MG tablet Take 10 mg by mouth daily (Patient not taking: No sig reported)      Cholecalciferol (VITAMIN D) 2000 UNITS CAPS capsule Take by mouth (Patient not taking: No sig reported)       No current facility-administered medications for this visit. Return in about 3 months (around 10/26/2022), or if symptoms worsen or fail to improve, for Hyperlipidmeia, depression.     Suhail Robles MD, MD

## 2022-08-03 RX ORDER — FOLIC ACID 1 MG/1
1 TABLET ORAL DAILY
Qty: 30 TABLET | Refills: 2 | Status: SHIPPED | OUTPATIENT
Start: 2022-08-03 | End: 2022-10-26 | Stop reason: SDUPTHER

## 2022-09-15 ENCOUNTER — TELEPHONE (OUTPATIENT)
Dept: FAMILY MEDICINE CLINIC | Age: 67
End: 2022-09-15

## 2022-09-15 NOTE — TELEPHONE ENCOUNTER
Trazodone was last sent on 7/26 30 tabs with 2 refills. Pt should have enough until appt on 10/26. Spoke with pt to advise of this. Pt stated that she got  and her insurance will be switching on Oct. 1.    New last name is Bibi. Advised pt that we will need to scan in new legal ID before we can change the name in our system. Pt verbalized understanding.

## (undated) DEVICE — SUTURE V-LOC 180 SZ 2-0 L6IN ABSRB GRN GS-22 L27MM 1/2 CIR VLOCL2105

## (undated) DEVICE — COVER LT HNDL BLU PLAS

## (undated) DEVICE — SUTURE VCRL SZ 4-0 L18IN ABSRB UD L19MM PS-2 3/8 CIR PRIM J496H

## (undated) DEVICE — TIP COVER ACCESSORY

## (undated) DEVICE — PENCIL ES L3M BTTN SWCH S STL HEX LOK BLDE ELECTRD HOLSTER

## (undated) DEVICE — ROBOTIC PK

## (undated) DEVICE — 30977 SEE SHARP - ENHANCED INTRAOPERATIVE LAPAROSCOPE CLEANING & DEFOGGING: Brand: 30977 SEE SHARP - ENHANCED INTRAOPERATIVE LAPAROSCOPE CLEANING & DEFOGGING

## (undated) DEVICE — CHLORAPREP 26ML ORANGE

## (undated) DEVICE — NEEDLE INSUF L120MM DIA2MM DISP FOR PNEUMOPERI ENDOPATH

## (undated) DEVICE — BLADELESS OBTURATOR: Brand: WECK VISTA

## (undated) DEVICE — CANNULA SEAL

## (undated) DEVICE — STERILE POLYISOPRENE POWDER-FREE SURGICAL GLOVES: Brand: PROTEXIS

## (undated) DEVICE — TUBING INSUFFLATION 10FT HIGH FLOW LEUR

## (undated) DEVICE — LAPAROSCOPIC SCISSORS: Brand: EPIX LAPAROSCOPIC SCISSORS

## (undated) DEVICE — BLANKET WRM W29.9XL79.1IN UP BODY FORC AIR MISTRAL-AIR

## (undated) DEVICE — SUTURE VCRL SZ 2-0 L27IN ABSRB UD L26MM SH 1/2 CIR J417H

## (undated) DEVICE — MERCY FAIRFIELD TURNOVER KIT: Brand: MEDLINE INDUSTRIES, INC.

## (undated) DEVICE — ARM DRAPE

## (undated) DEVICE — ELECTRODE PT RET AD L9FT HI MOIST COND ADH HYDRGEL CORDED

## (undated) DEVICE — Device